# Patient Record
Sex: FEMALE | Race: WHITE | HISPANIC OR LATINO | URBAN - METROPOLITAN AREA
[De-identification: names, ages, dates, MRNs, and addresses within clinical notes are randomized per-mention and may not be internally consistent; named-entity substitution may affect disease eponyms.]

---

## 2021-03-07 ENCOUNTER — INPATIENT (INPATIENT)
Facility: HOSPITAL | Age: 36
LOS: 6 days | Discharge: HOME | DRG: 177 | End: 2021-03-14
Attending: HOSPITALIST | Admitting: HOSPITALIST
Payer: MEDICARE

## 2021-03-07 VITALS
HEIGHT: 64 IN | OXYGEN SATURATION: 92 % | SYSTOLIC BLOOD PRESSURE: 136 MMHG | RESPIRATION RATE: 26 BRPM | HEART RATE: 123 BPM | TEMPERATURE: 100 F | WEIGHT: 244.93 LBS | DIASTOLIC BLOOD PRESSURE: 78 MMHG

## 2021-03-07 DIAGNOSIS — R06.02 SHORTNESS OF BREATH: ICD-10-CM

## 2021-03-07 LAB
ALBUMIN SERPL ELPH-MCNC: 3.5 G/DL — SIGNIFICANT CHANGE UP (ref 3.3–5.2)
ALP SERPL-CCNC: 57 U/L — SIGNIFICANT CHANGE UP (ref 40–120)
ALT FLD-CCNC: 60 U/L — HIGH
ANION GAP SERPL CALC-SCNC: 12 MMOL/L — SIGNIFICANT CHANGE UP (ref 5–17)
AST SERPL-CCNC: 69 U/L — HIGH
BASOPHILS # BLD AUTO: 0.03 K/UL — SIGNIFICANT CHANGE UP (ref 0–0.2)
BASOPHILS NFR BLD AUTO: 0.9 % — SIGNIFICANT CHANGE UP (ref 0–2)
BILIRUB SERPL-MCNC: 0.3 MG/DL — LOW (ref 0.4–2)
BUN SERPL-MCNC: 6 MG/DL — LOW (ref 8–20)
CALCIUM SERPL-MCNC: 7.9 MG/DL — LOW (ref 8.6–10.2)
CHLORIDE SERPL-SCNC: 98 MMOL/L — SIGNIFICANT CHANGE UP (ref 98–107)
CO2 SERPL-SCNC: 25 MMOL/L — SIGNIFICANT CHANGE UP (ref 22–29)
CREAT SERPL-MCNC: 0.5 MG/DL — SIGNIFICANT CHANGE UP (ref 0.5–1.3)
CRP SERPL-MCNC: 125 MG/L — HIGH
D DIMER BLD IA.RAPID-MCNC: 359 NG/ML DDU — HIGH
EOSINOPHIL # BLD AUTO: 0 K/UL — SIGNIFICANT CHANGE UP (ref 0–0.5)
EOSINOPHIL NFR BLD AUTO: 0 % — SIGNIFICANT CHANGE UP (ref 0–6)
FERRITIN SERPL-MCNC: 623 NG/ML — HIGH (ref 15–150)
GIANT PLATELETS BLD QL SMEAR: PRESENT — SIGNIFICANT CHANGE UP
GLUCOSE BLDC GLUCOMTR-MCNC: 115 MG/DL — HIGH (ref 70–99)
GLUCOSE SERPL-MCNC: 110 MG/DL — HIGH (ref 70–99)
HCT VFR BLD CALC: 39.3 % — SIGNIFICANT CHANGE UP (ref 34.5–45)
HGB BLD-MCNC: 13.3 G/DL — SIGNIFICANT CHANGE UP (ref 11.5–15.5)
LACTATE BLDV-MCNC: 1 MMOL/L — SIGNIFICANT CHANGE UP (ref 0.5–2)
LYMPHOCYTES # BLD AUTO: 0.57 K/UL — LOW (ref 1–3.3)
LYMPHOCYTES # BLD AUTO: 20.3 % — SIGNIFICANT CHANGE UP (ref 13–44)
MANUAL SMEAR VERIFICATION: SIGNIFICANT CHANGE UP
MCHC RBC-ENTMCNC: 30 PG — SIGNIFICANT CHANGE UP (ref 27–34)
MCHC RBC-ENTMCNC: 33.8 GM/DL — SIGNIFICANT CHANGE UP (ref 32–36)
MCV RBC AUTO: 88.5 FL — SIGNIFICANT CHANGE UP (ref 80–100)
MONOCYTES # BLD AUTO: 0.2 K/UL — SIGNIFICANT CHANGE UP (ref 0–0.9)
MONOCYTES NFR BLD AUTO: 7.1 % — SIGNIFICANT CHANGE UP (ref 2–14)
NEUTROPHILS # BLD AUTO: 1.99 K/UL — SIGNIFICANT CHANGE UP (ref 1.8–7.4)
NEUTROPHILS NFR BLD AUTO: 69 % — SIGNIFICANT CHANGE UP (ref 43–77)
NEUTS BAND # BLD: 1.8 % — SIGNIFICANT CHANGE UP (ref 0–8)
PLAT MORPH BLD: NORMAL — SIGNIFICANT CHANGE UP
PLATELET # BLD AUTO: 168 K/UL — SIGNIFICANT CHANGE UP (ref 150–400)
POLYCHROMASIA BLD QL SMEAR: SLIGHT — SIGNIFICANT CHANGE UP
POTASSIUM SERPL-MCNC: 3.4 MMOL/L — LOW (ref 3.5–5.3)
POTASSIUM SERPL-SCNC: 3.4 MMOL/L — LOW (ref 3.5–5.3)
PROCALCITONIN SERPL-MCNC: 0.13 NG/ML — HIGH (ref 0.02–0.1)
PROT SERPL-MCNC: 7 G/DL — SIGNIFICANT CHANGE UP (ref 6.6–8.7)
RBC # BLD: 4.44 M/UL — SIGNIFICANT CHANGE UP (ref 3.8–5.2)
RBC # FLD: 13.7 % — SIGNIFICANT CHANGE UP (ref 10.3–14.5)
RBC BLD AUTO: SIGNIFICANT CHANGE UP
SODIUM SERPL-SCNC: 135 MMOL/L — SIGNIFICANT CHANGE UP (ref 135–145)
VARIANT LYMPHS # BLD: 0.9 % — SIGNIFICANT CHANGE UP (ref 0–6)
WBC # BLD: 2.81 K/UL — LOW (ref 3.8–10.5)
WBC # FLD AUTO: 2.81 K/UL — LOW (ref 3.8–10.5)

## 2021-03-07 PROCEDURE — 99284 EMERGENCY DEPT VISIT MOD MDM: CPT

## 2021-03-07 PROCEDURE — 93010 ELECTROCARDIOGRAM REPORT: CPT

## 2021-03-07 PROCEDURE — 71045 X-RAY EXAM CHEST 1 VIEW: CPT | Mod: 26

## 2021-03-07 PROCEDURE — 99223 1ST HOSP IP/OBS HIGH 75: CPT

## 2021-03-07 RX ORDER — SODIUM CHLORIDE 9 MG/ML
1000 INJECTION, SOLUTION INTRAVENOUS
Refills: 0 | Status: DISCONTINUED | OUTPATIENT
Start: 2021-03-07 | End: 2021-03-14

## 2021-03-07 RX ORDER — INSULIN LISPRO 100/ML
VIAL (ML) SUBCUTANEOUS
Refills: 0 | Status: DISCONTINUED | OUTPATIENT
Start: 2021-03-07 | End: 2021-03-14

## 2021-03-07 RX ORDER — ACETAMINOPHEN 500 MG
650 TABLET ORAL EVERY 6 HOURS
Refills: 0 | Status: DISCONTINUED | OUTPATIENT
Start: 2021-03-07 | End: 2021-03-14

## 2021-03-07 RX ORDER — DEXTROSE 50 % IN WATER 50 %
15 SYRINGE (ML) INTRAVENOUS ONCE
Refills: 0 | Status: DISCONTINUED | OUTPATIENT
Start: 2021-03-07 | End: 2021-03-14

## 2021-03-07 RX ORDER — ENOXAPARIN SODIUM 100 MG/ML
40 INJECTION SUBCUTANEOUS EVERY 12 HOURS
Refills: 0 | Status: DISCONTINUED | OUTPATIENT
Start: 2021-03-07 | End: 2021-03-14

## 2021-03-07 RX ORDER — ZINC SULFATE TAB 220 MG (50 MG ZINC EQUIVALENT) 220 (50 ZN) MG
220 TAB ORAL DAILY
Refills: 0 | Status: DISCONTINUED | OUTPATIENT
Start: 2021-03-07 | End: 2021-03-14

## 2021-03-07 RX ORDER — GLUCAGON INJECTION, SOLUTION 0.5 MG/.1ML
1 INJECTION, SOLUTION SUBCUTANEOUS ONCE
Refills: 0 | Status: DISCONTINUED | OUTPATIENT
Start: 2021-03-07 | End: 2021-03-14

## 2021-03-07 RX ORDER — DEXAMETHASONE 0.5 MG/5ML
6 ELIXIR ORAL DAILY
Refills: 0 | Status: DISCONTINUED | OUTPATIENT
Start: 2021-03-07 | End: 2021-03-14

## 2021-03-07 RX ORDER — ASCORBIC ACID 60 MG
500 TABLET,CHEWABLE ORAL DAILY
Refills: 0 | Status: DISCONTINUED | OUTPATIENT
Start: 2021-03-07 | End: 2021-03-08

## 2021-03-07 RX ORDER — DEXTROSE 50 % IN WATER 50 %
12.5 SYRINGE (ML) INTRAVENOUS ONCE
Refills: 0 | Status: DISCONTINUED | OUTPATIENT
Start: 2021-03-07 | End: 2021-03-14

## 2021-03-07 RX ORDER — CHOLECALCIFEROL (VITAMIN D3) 125 MCG
400 CAPSULE ORAL
Refills: 0 | Status: DISCONTINUED | OUTPATIENT
Start: 2021-03-07 | End: 2021-03-08

## 2021-03-07 RX ORDER — DEXTROSE 50 % IN WATER 50 %
25 SYRINGE (ML) INTRAVENOUS ONCE
Refills: 0 | Status: DISCONTINUED | OUTPATIENT
Start: 2021-03-07 | End: 2021-03-14

## 2021-03-07 RX ORDER — DEXAMETHASONE 0.5 MG/5ML
6 ELIXIR ORAL ONCE
Refills: 0 | Status: COMPLETED | OUTPATIENT
Start: 2021-03-07 | End: 2021-03-07

## 2021-03-07 RX ORDER — ONDANSETRON 8 MG/1
4 TABLET, FILM COATED ORAL EVERY 6 HOURS
Refills: 0 | Status: DISCONTINUED | OUTPATIENT
Start: 2021-03-07 | End: 2021-03-14

## 2021-03-07 RX ORDER — POTASSIUM CHLORIDE 20 MEQ
40 PACKET (EA) ORAL ONCE
Refills: 0 | Status: COMPLETED | OUTPATIENT
Start: 2021-03-07 | End: 2021-03-07

## 2021-03-07 RX ORDER — SODIUM CHLORIDE 9 MG/ML
3 INJECTION INTRAMUSCULAR; INTRAVENOUS; SUBCUTANEOUS EVERY 8 HOURS
Refills: 0 | Status: DISCONTINUED | OUTPATIENT
Start: 2021-03-07 | End: 2021-03-14

## 2021-03-07 RX ADMIN — SODIUM CHLORIDE 3 MILLILITER(S): 9 INJECTION INTRAMUSCULAR; INTRAVENOUS; SUBCUTANEOUS at 22:09

## 2021-03-07 RX ADMIN — Medication 6 MILLIGRAM(S): at 20:29

## 2021-03-07 RX ADMIN — Medication 40 MILLIEQUIVALENT(S): at 22:10

## 2021-03-07 NOTE — ED ADULT NURSE NOTE - CHIEF COMPLAINT QUOTE
Palliative Care mid sternal chest pain with SOB, congestion and fever since friday. was prescribed azithromycin for " congestion", now feeling worse. 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

## 2021-03-07 NOTE — ED PROVIDER NOTE - ATTENDING CONTRIBUTION TO CARE
Pt. awake and alert. Pt. hypoxic. Pt. will require admission. I, Dr. Brown, performed a face to face bedside interview with this patient regarding history of present illness, review of symptoms and relevant past medical, social and family history.  I completed an independent physical examination.  I have also reviewed the ACP's note(s) and discussed the plan with the ACP.

## 2021-03-07 NOTE — H&P ADULT - PROBLEM SELECTOR PLAN 1
Admit to , cont. with supplemental 02, IV decadron, Incentive spirometer, prn tylenol, zinc, vit. c/d, trend inflammatory markers, Start on remdesivir once COVID PCR confirmed. OOB, DVT-P, ambulate.

## 2021-03-07 NOTE — ED ADULT TRIAGE NOTE - CHIEF COMPLAINT QUOTE
mid sternal chest pain with SOB, congestion and fever since friday. was prescribed azithromycin for " congestion", now feeling worse. 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

## 2021-03-07 NOTE — H&P ADULT - HISTORY OF PRESENT ILLNESS
36 y/o female who denies any known pmhx who has been home with her daughter and  c/o fever, cough, muscle aches. 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 She was given Zithromax by that office and on Friday and took 2 doses but it did not help her symptoms. She came to the ED when her home pulse oximeter showed she was saturating 90 on RA. In the ED noted to be 88% RA, goes up to 95% on 6 liters NC. Inflammatory markers elevated, COVID pending. CXR with diffuse intersitial infiltrates.

## 2021-03-07 NOTE — ED PROVIDER NOTE - OBJECTIVE STATEMENT
34 y/o F c/o fever, cough, shortness of breath, loss of appetite x 3 days.  Patient has not yet been tested for covid.  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  Denies any PMH.

## 2021-03-08 DIAGNOSIS — U07.1 COVID-19: ICD-10-CM

## 2021-03-08 DIAGNOSIS — J96.01 ACUTE RESPIRATORY FAILURE WITH HYPOXIA: ICD-10-CM

## 2021-03-08 LAB
A1C WITH ESTIMATED AVERAGE GLUCOSE RESULT: 5.5 % — SIGNIFICANT CHANGE UP (ref 4–5.6)
ALBUMIN SERPL ELPH-MCNC: 3.3 G/DL — SIGNIFICANT CHANGE UP (ref 3.3–5.2)
ALP SERPL-CCNC: 57 U/L — SIGNIFICANT CHANGE UP (ref 40–120)
ALT FLD-CCNC: 57 U/L — HIGH
ANION GAP SERPL CALC-SCNC: 13 MMOL/L — SIGNIFICANT CHANGE UP (ref 5–17)
AST SERPL-CCNC: 53 U/L — HIGH
BASOPHILS # BLD AUTO: 0 K/UL — SIGNIFICANT CHANGE UP (ref 0–0.2)
BASOPHILS NFR BLD AUTO: 0 % — SIGNIFICANT CHANGE UP (ref 0–2)
BILIRUB DIRECT SERPL-MCNC: 0.1 MG/DL — SIGNIFICANT CHANGE UP (ref 0–0.3)
BILIRUB INDIRECT FLD-MCNC: 0.2 MG/DL — SIGNIFICANT CHANGE UP (ref 0.2–1)
BILIRUB SERPL-MCNC: 0.3 MG/DL — LOW (ref 0.4–2)
BUN SERPL-MCNC: 8 MG/DL — SIGNIFICANT CHANGE UP (ref 8–20)
CALCIUM SERPL-MCNC: 8 MG/DL — LOW (ref 8.6–10.2)
CHLORIDE SERPL-SCNC: 101 MMOL/L — SIGNIFICANT CHANGE UP (ref 98–107)
CO2 SERPL-SCNC: 26 MMOL/L — SIGNIFICANT CHANGE UP (ref 22–29)
CREAT SERPL-MCNC: 0.43 MG/DL — LOW (ref 0.5–1.3)
EOSINOPHIL # BLD AUTO: 0 K/UL — SIGNIFICANT CHANGE UP (ref 0–0.5)
EOSINOPHIL NFR BLD AUTO: 0 % — SIGNIFICANT CHANGE UP (ref 0–6)
ESTIMATED AVERAGE GLUCOSE: 111 MG/DL — SIGNIFICANT CHANGE UP (ref 68–114)
GLUCOSE BLDC GLUCOMTR-MCNC: 109 MG/DL — HIGH (ref 70–99)
GLUCOSE BLDC GLUCOMTR-MCNC: 121 MG/DL — HIGH (ref 70–99)
GLUCOSE BLDC GLUCOMTR-MCNC: 127 MG/DL — HIGH (ref 70–99)
GLUCOSE BLDC GLUCOMTR-MCNC: 129 MG/DL — HIGH (ref 70–99)
GLUCOSE SERPL-MCNC: 142 MG/DL — HIGH (ref 70–99)
HCT VFR BLD CALC: 37.6 % — SIGNIFICANT CHANGE UP (ref 34.5–45)
HGB BLD-MCNC: 12.7 G/DL — SIGNIFICANT CHANGE UP (ref 11.5–15.5)
IMM GRANULOCYTES NFR BLD AUTO: 0 % — SIGNIFICANT CHANGE UP (ref 0–1.5)
INR BLD: 1.17 RATIO — HIGH (ref 0.88–1.16)
LYMPHOCYTES # BLD AUTO: 0.51 K/UL — LOW (ref 1–3.3)
LYMPHOCYTES # BLD AUTO: 33.6 % — SIGNIFICANT CHANGE UP (ref 13–44)
MAGNESIUM SERPL-MCNC: 2.2 MG/DL — SIGNIFICANT CHANGE UP (ref 1.8–2.6)
MCHC RBC-ENTMCNC: 30.3 PG — SIGNIFICANT CHANGE UP (ref 27–34)
MCHC RBC-ENTMCNC: 33.8 GM/DL — SIGNIFICANT CHANGE UP (ref 32–36)
MCV RBC AUTO: 89.7 FL — SIGNIFICANT CHANGE UP (ref 80–100)
MONOCYTES # BLD AUTO: 0.12 K/UL — SIGNIFICANT CHANGE UP (ref 0–0.9)
MONOCYTES NFR BLD AUTO: 7.9 % — SIGNIFICANT CHANGE UP (ref 2–14)
NEUTROPHILS # BLD AUTO: 0.89 K/UL — LOW (ref 1.8–7.4)
NEUTROPHILS NFR BLD AUTO: 58.5 % — SIGNIFICANT CHANGE UP (ref 43–77)
PHOSPHATE SERPL-MCNC: 3.2 MG/DL — SIGNIFICANT CHANGE UP (ref 2.4–4.7)
PLATELET # BLD AUTO: 175 K/UL — SIGNIFICANT CHANGE UP (ref 150–400)
POTASSIUM SERPL-MCNC: 3.9 MMOL/L — SIGNIFICANT CHANGE UP (ref 3.5–5.3)
POTASSIUM SERPL-SCNC: 3.9 MMOL/L — SIGNIFICANT CHANGE UP (ref 3.5–5.3)
PROT SERPL-MCNC: 6.8 G/DL — SIGNIFICANT CHANGE UP (ref 6.6–8.7)
PROTHROM AB SERPL-ACNC: 13.5 SEC — SIGNIFICANT CHANGE UP (ref 10.6–13.6)
RBC # BLD: 4.19 M/UL — SIGNIFICANT CHANGE UP (ref 3.8–5.2)
RBC # FLD: 13.9 % — SIGNIFICANT CHANGE UP (ref 10.3–14.5)
SARS-COV-2 IGG SERPL QL IA: POSITIVE
SARS-COV-2 IGM SERPL IA-ACNC: 3.71 INDEX — HIGH
SARS-COV-2 RNA SPEC QL NAA+PROBE: DETECTED
SODIUM SERPL-SCNC: 140 MMOL/L — SIGNIFICANT CHANGE UP (ref 135–145)
WBC # BLD: 1.52 K/UL — LOW (ref 3.8–10.5)
WBC # FLD AUTO: 1.52 K/UL — LOW (ref 3.8–10.5)

## 2021-03-08 PROCEDURE — 99222 1ST HOSP IP/OBS MODERATE 55: CPT

## 2021-03-08 PROCEDURE — 99233 SBSQ HOSP IP/OBS HIGH 50: CPT

## 2021-03-08 RX ORDER — REMDESIVIR 5 MG/ML
INJECTION INTRAVENOUS
Refills: 0 | Status: COMPLETED | OUTPATIENT
Start: 2021-03-08 | End: 2021-03-12

## 2021-03-08 RX ORDER — ASCORBIC ACID 60 MG
500 TABLET,CHEWABLE ORAL
Refills: 0 | Status: DISCONTINUED | OUTPATIENT
Start: 2021-03-08 | End: 2021-03-14

## 2021-03-08 RX ORDER — CHOLECALCIFEROL (VITAMIN D3) 125 MCG
2000 CAPSULE ORAL DAILY
Refills: 0 | Status: DISCONTINUED | OUTPATIENT
Start: 2021-03-08 | End: 2021-03-14

## 2021-03-08 RX ORDER — REMDESIVIR 5 MG/ML
200 INJECTION INTRAVENOUS EVERY 24 HOURS
Refills: 0 | Status: COMPLETED | OUTPATIENT
Start: 2021-03-08 | End: 2021-03-08

## 2021-03-08 RX ORDER — REMDESIVIR 5 MG/ML
100 INJECTION INTRAVENOUS EVERY 24 HOURS
Refills: 0 | Status: COMPLETED | OUTPATIENT
Start: 2021-03-09 | End: 2021-03-12

## 2021-03-08 RX ORDER — LORATADINE 10 MG/1
10 TABLET ORAL DAILY
Refills: 0 | Status: DISCONTINUED | OUTPATIENT
Start: 2021-03-08 | End: 2021-03-14

## 2021-03-08 RX ORDER — FAMOTIDINE 10 MG/ML
20 INJECTION INTRAVENOUS
Refills: 0 | Status: DISCONTINUED | OUTPATIENT
Start: 2021-03-08 | End: 2021-03-14

## 2021-03-08 RX ADMIN — ENOXAPARIN SODIUM 40 MILLIGRAM(S): 100 INJECTION SUBCUTANEOUS at 18:25

## 2021-03-08 RX ADMIN — LORATADINE 10 MILLIGRAM(S): 10 TABLET ORAL at 13:16

## 2021-03-08 RX ADMIN — Medication 1 TABLET(S): at 13:18

## 2021-03-08 RX ADMIN — SODIUM CHLORIDE 3 MILLILITER(S): 9 INJECTION INTRAMUSCULAR; INTRAVENOUS; SUBCUTANEOUS at 06:04

## 2021-03-08 RX ADMIN — FAMOTIDINE 20 MILLIGRAM(S): 10 INJECTION INTRAVENOUS at 18:25

## 2021-03-08 RX ADMIN — ZINC SULFATE TAB 220 MG (50 MG ZINC EQUIVALENT) 220 MILLIGRAM(S): 220 (50 ZN) TAB at 13:16

## 2021-03-08 RX ADMIN — Medication 2000 UNIT(S): at 13:26

## 2021-03-08 RX ADMIN — REMDESIVIR 500 MILLIGRAM(S): 5 INJECTION INTRAVENOUS at 06:01

## 2021-03-08 RX ADMIN — Medication 500 MILLIGRAM(S): at 18:25

## 2021-03-08 RX ADMIN — Medication 400 UNIT(S): at 06:01

## 2021-03-08 RX ADMIN — Medication 100 MILLIGRAM(S): at 13:18

## 2021-03-08 RX ADMIN — Medication 6 MILLIGRAM(S): at 06:02

## 2021-03-08 RX ADMIN — SODIUM CHLORIDE 3 MILLILITER(S): 9 INJECTION INTRAMUSCULAR; INTRAVENOUS; SUBCUTANEOUS at 21:48

## 2021-03-08 RX ADMIN — Medication 100 MILLIGRAM(S): at 21:47

## 2021-03-08 RX ADMIN — ENOXAPARIN SODIUM 40 MILLIGRAM(S): 100 INJECTION SUBCUTANEOUS at 06:02

## 2021-03-08 RX ADMIN — SODIUM CHLORIDE 3 MILLILITER(S): 9 INJECTION INTRAMUSCULAR; INTRAVENOUS; SUBCUTANEOUS at 14:20

## 2021-03-08 NOTE — CONSULT NOTE ADULT - ASSESSMENT
This 34 y/o female who denies any known pmhx who has been home with her daughter and  c/o fever, cough, muscle aches. 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 She was given Zithromax by that office and on Friday and took 2 doses but it did not help her symptoms. She came to the ED when her home pulse oximeter showed she was saturating 90 on RA. In the ED noted to be 88% RA, goes up to 95% on 6 liters NC. Inflammatory markers elevated, COVID pending. CXR with diffuse intersitial infiltrates.  (07 Mar 2021 23:04)      patient still has SOB. on oxygen, Currently proning. COVID testing is confirmed positive here.   Xray of the lungs show: Bilateral patchy airspace opacities, most prominent at the lung bases, compatible with multifocal pneumonia. Follow-up to resolution is recommended.    patient is on nasal cannula.      Impression:  COVID-19 infection   Viral pneumonia  shortness of breath  lung infiltrates  dependence on oxygen  Leukopenia    Plan:  - start Remdesivir IV daily.  will plan for a 5 day course.   - MAY stop earlier than 5 days, and send home, if patient is back on Ambient oxygen/ room air.  - alternatively MAY need a 10 day course if little improvement during the 5 day course.      - continue dexamethasone 6mg daily, While on remdesivir  Inflammatory markers and LFTs WILL BE ORDERED with the REMDESIVIR order SET.  DO NOT ORDER this additionally.   - trend CBC with diff, CMP  daily    - Continue supportive care measures  - continue Oxygenation as needed;  CONTINUE to titrate down as tolerated  - self- proning  as tolerated  - ENCOURAGED OOB to chair  - encouraged incentive spirometry        Leukopenia  - from viral infection  - will follow & trend      Will follow with you.

## 2021-03-08 NOTE — CONSULT NOTE ADULT - SUBJECTIVE AND OBJECTIVE BOX
[facility]  INFECTIOUS DISEASES AND INTERNAL MEDICINE at [place]  =======================================================  Lincoln Donohue MD  Diplomates American Board of Internal Medicine and Infectious Diseases  Tel  [phone]  Fax [phone]  =======================================================    [id number]  BOBBY TREVINO   HPI:  36 y/o female who denies any known pmhx who has been home with her daughter and  c/o fever, cough, muscle aches. 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 She was given Zithromax by that office and on Friday and took 2 doses but it did not help her symptoms. She came to the ED when her home pulse oximeter showed she was saturating 90 on RA. In the ED noted to be 88% RA, goes up to 95% on 6 liters NC. Inflammatory markers elevated, COVID pending. CXR with diffuse intersitial infiltrates.  (07 Mar 2021 23:04)      patient still has SOB. on oxygen, Currently proning. COVID testing is confirmed positive here.   Xray of the lungs show: Bilateral patchy airspace opacities, most prominent at the lung bases, compatible with multifocal pneumonia. Follow-up to resolution is recommended.    patient is on nasal cannula.    I have personally reviewed the labs and data; pertinent labs and data are listed in this note; please see below.   =======================================================  Past Medical & Surgical Hx:  =====================  PAST MEDICAL & SURGICAL HISTORY:  No pertinent past medical history    No significant past surgical history      Problem List:  ==========  HEALTH ISSUES - PROBLEM Dx:  2019 novel coronavirus disease (COVID-19)  Acute respiratory failure with hypoxia         Social Hx:  =======  no toxic habits currently    FAMILY HISTORY:  no significant family history of immunosuppressive disorders in mother or father   =======================================================    REVIEW OF SYSTEMS:  CONSTITUTIONAL:  as per HPI  HEENT:  No diplopia or blurred vision.  No earache, sore throat or runny nose.  CARDIOVASCULAR:  No pressure, squeezing, strangling, tightness, heaviness or aching about the chest, neck, axilla or epigastrium.  RESPIRATORY:  as per HPI  GASTROINTESTINAL:  No nausea, vomiting or diarrhea.  GENITOURINARY:  No dysuria, frequency or urgency. No Blood in urine  MUSCULOSKELETAL:  no joint aches, no muscle pain  SKIN:  No change in skin, hair or nails.  NEUROLOGIC:  No Headaches, seizures or weakness.  PSYCHIATRIC:  No disorder of thought or mood.  ENDOCRINE:  No heat or cold intolerance  HEMATOLOGICAL:  No easy bruising or bleeding.    =======================================================  Allergies  No Known Allergies      Antibiotics:  remdesivir  IVPB   IV Intermittent     Other medications:  ascorbic acid 500 milliGRAM(s) Oral two times a day  benzonatate 100 milliGRAM(s) Oral three times a day  cholecalciferol 2000 Unit(s) Oral daily  dexAMETHasone  Injectable 6 milliGRAM(s) IV Push daily  dextrose 40% Gel 15 Gram(s) Oral once  dextrose 5%. 1000 milliLiter(s) IV Continuous <Continuous>  dextrose 5%. 1000 milliLiter(s) IV Continuous <Continuous>  dextrose 50% Injectable 25 Gram(s) IV Push once  dextrose 50% Injectable 12.5 Gram(s) IV Push once  dextrose 50% Injectable 25 Gram(s) IV Push once  enoxaparin Injectable 40 milliGRAM(s) SubCutaneous every 12 hours  famotidine    Tablet 20 milliGRAM(s) Oral two times a day  glucagon  Injectable 1 milliGRAM(s) IntraMuscular once  insulin lispro (ADMELOG) corrective regimen sliding scale   SubCutaneous Before meals and at bedtime  loratadine 10 milliGRAM(s) Oral daily  multivitamin 1 Tablet(s) Oral daily  sodium chloride 0.9% lock flush 3 milliLiter(s) IV Push every 8 hours  zinc sulfate 220 milliGRAM(s) Oral daily     remdesivir  IVPB   500 mL/Hr IV Intermittent (03-08-21 @ 06:01)      ======================================================  Physical Exam:  ============  T(F): 98.5 (08 Mar 2021 11:45), Max: 100 (07 Mar 2021 18:29)  HR: 84 (08 Mar 2021 11:45)  BP: 126/86 (08 Mar 2021 11:45)  RR: 18 (08 Mar 2021 11:45)  SpO2: 92% (08 Mar 2021 11:45) (88% - 98%)  temp max in last 48H T(F): , Max: 100 (03-07-21 @ 18:29)Height (cm): 162.6 (03-07-21 @ 18:29)  Weight (kg): 111.1 (03-07-21 @ 18:29)  BMI (kg/m2): 42 (03-07-21 @ 18:29)  BSA (m2): 2.13 (03-07-21 @ 18:29)    General:  No acute distress.  OBESE  Eye: Pupils are equal, round and reactive to light, Extraocular movements are intact, Normal conjunctiva.  HENT: Normocephalic, Oral mucosa is moist, No pharyngeal erythema, No sinus tenderness.  ON SUPPLEMENTAL OXYGEN  Neck: Supple, No lymphadenopathy.  Respiratory: Lungs  with COARSE breath sounds; worse at bases  Cardiovascular: Normal rate, Regular rhythm,   Gastrointestinal: Soft, Non-tender, Non-distended, Normal bowel sounds.  Genitourinary: No costovertebral angle tenderness.  Lymphatics: No lymphadenopathy neck,   Musculoskeletal: Normal range of motion, Normal strength.  Integumentary: No rash.  Neurologic: Alert, Oriented, No focal deficits, Cranial Nerves II-XII are grossly intact.  Psychiatric: Appropriate mood & affect.    =======================================================  Labs:                        12.7   1.52  )-----------( 175      ( 08 Mar 2021 07:13 )             37.6     03-08    140  |  101  |  8.0  ----------------------------<  142<H>  3.9   |  26.0  |  0.43<L>    Ca    8.0<L>      08 Mar 2021 07:11  Phos  3.2     03-08  Mg     2.2     03-08    TPro  6.8  /  Alb  3.3  /  TBili  0.3<L>  /  DBili  0.1  /  AST  53<H>  /  ALT  57<H>  /  AlkPhos  57  03-08      Creatinine, Serum: 0.43 mg/dL (03-08-21 @ 07:11)  Creatinine, Serum: 0.50 mg/dL (03-07-21 @ 20:15)    C-Reactive Protein, Serum: 125 mg/L (03-07-21 @ 20:15)      Procalcitonin, Serum: 0.13 ng/mL (03-07-21 @ 20:15)      COVID-19 PCR: Detected (03-07-21 @ 20:29)      < from: Xray Chest 1 View-PORTABLE IMMEDIATE (03.07.21 @ 19:51) >   EXAM:  XR CHEST PORTABLE IMMED 1V                          PROCEDURE DATE:  03/07/2021          INTERPRETATION:  PROCEDURE: AP view of the chest.    CLINICAL INFORMATION: Shortness of breath, cough and fever.    COMPARISON: None.    FINDINGS:    Lungs: There are bilateral patchy airspace opacities most prominent at the lung bases.  Heart: The heart is normal in size.  Mediastinum: The mediastinum is within normal limits.    IMPRESSION:  Bilateral patchy airspace opacities, most prominent at the lung bases, compatible with multifocal pneumonia. Follow-up to resolution is recommended.         NIRANJAN GUEVARA MD; Attending Radiologist  This document has been electronically signed. Mar  8 2021  8:07AM    < end of copied text >

## 2021-03-08 NOTE — PROGRESS NOTE ADULT - SUBJECTIVE AND OBJECTIVE BOX
Internal Medicine   CC SOB and cough    pt is seen examined , she is with c/o cough and SOB worse on breathing feels liek can not breath in   denies any CP , looks comfortable     she had tried proning states that  was not able to tolerate it much , encourage her to try again and cont breathing exercise               ROS: as above, all remaining ROS are negative.       BACKGROUND:  MEDICATIONS  (STANDING):  ascorbic acid 500 milliGRAM(s) Oral daily  cholecalciferol 400 Unit(s) Oral two times a day  dexAMETHasone  Injectable 6 milliGRAM(s) IV Push daily  dextrose 40% Gel 15 Gram(s) Oral once  dextrose 5%. 1000 milliLiter(s) (50 mL/Hr) IV Continuous <Continuous>  dextrose 5%. 1000 milliLiter(s) (100 mL/Hr) IV Continuous <Continuous>  dextrose 50% Injectable 25 Gram(s) IV Push once  dextrose 50% Injectable 12.5 Gram(s) IV Push once  dextrose 50% Injectable 25 Gram(s) IV Push once  enoxaparin Injectable 40 milliGRAM(s) SubCutaneous every 12 hours  glucagon  Injectable 1 milliGRAM(s) IntraMuscular once  insulin lispro (ADMELOG) corrective regimen sliding scale   SubCutaneous Before meals and at bedtime  multivitamin 1 Tablet(s) Oral daily  remdesivir  IVPB   IV Intermittent   sodium chloride 0.9% lock flush 3 milliLiter(s) IV Push every 8 hours  zinc sulfate 220 milliGRAM(s) Oral daily    MEDICATIONS  (PRN):  acetaminophen   Tablet .. 650 milliGRAM(s) Oral every 6 hours PRN Temp greater or equal to 38C (100.4F), Mild Pain (1 - 3)  ondansetron Injectable 4 milliGRAM(s) IV Push every 6 hours PRN Nausea    Allergies    No Known Allergies    Intolerances            VITALS:  Vital Signs Last 24 Hrs  T(C): 36.6 (08 Mar 2021 04:15), Max: 37.8 (07 Mar 2021 18:29)  T(F): 97.8 (08 Mar 2021 04:15), Max: 100 (07 Mar 2021 18:29)  HR: 103 (08 Mar 2021 04:15) (76 - 123)  BP: 131/84 (08 Mar 2021 04:15) (131/84 - 139/85)  BP(mean): 94 (07 Mar 2021 23:04) (94 - 94)  RR: 18 (08 Mar 2021 04:15) (18 - 28)  SpO2: 96% (08 Mar 2021 03:48) (88% - 98%) Daily Height in cm: 162.56 (07 Mar 2021 18:29)    Daily   CAPILLARY BLOOD GLUCOSE      POCT Blood Glucose.: 121 mg/dL (08 Mar 2021 08:27)  POCT Blood Glucose.: 115 mg/dL (07 Mar 2021 23:19)    I&O's Summary      PHYSICAL EXAM:      Constitutional: no resp distress  sitting in the bed     Neck: supple , no JVD     Respiratory: diminished BS , poor air entry     Cardiovascular: regular s1 /s2     Gastrointestinal: soft no tenderness , BS positive     Extremities: no pretibial edema           LABS:                        12.7   1.52  )-----------( 175      ( 08 Mar 2021 07:13 )             37.6     03-08    140  |  101  |  8.0  ----------------------------<  142<H>  3.9   |  26.0  |  0.43<L>    Ca    8.0<L>      08 Mar 2021 07:11  Phos  3.2     03-08  Mg     2.2     03-08    TPro  6.8  /  Alb  3.3  /  TBili  0.3<L>  /  DBili  0.1  /  AST  53<H>  /  ALT  57<H>  /  AlkPhos  57  03-08    PT/INR - ( 08 Mar 2021 07:10 )   PT: 13.5 sec;   INR: 1.17 ratio             Radiology :      c     Internal Medicine   CC SOB and cough    pt is seen examined , she is with c/o cough and SOB worse on breathing feels liek can not breath in   denies any CP , looks comfortable     she had tried proning states that  was not able to tolerate it much , encourage her to try again and cont breathing exercise           ROS: as above, all remaining ROS are negative.       BACKGROUND:  MEDICATIONS  (STANDING):  ascorbic acid 500 milliGRAM(s) Oral daily  cholecalciferol 400 Unit(s) Oral two times a day  dexAMETHasone  Injectable 6 milliGRAM(s) IV Push daily  dextrose 40% Gel 15 Gram(s) Oral once  dextrose 5%. 1000 milliLiter(s) (50 mL/Hr) IV Continuous <Continuous>  dextrose 5%. 1000 milliLiter(s) (100 mL/Hr) IV Continuous <Continuous>  dextrose 50% Injectable 25 Gram(s) IV Push once  dextrose 50% Injectable 12.5 Gram(s) IV Push once  dextrose 50% Injectable 25 Gram(s) IV Push once  enoxaparin Injectable 40 milliGRAM(s) SubCutaneous every 12 hours  glucagon  Injectable 1 milliGRAM(s) IntraMuscular once  insulin lispro (ADMELOG) corrective regimen sliding scale   SubCutaneous Before meals and at bedtime  multivitamin 1 Tablet(s) Oral daily  remdesivir  IVPB   IV Intermittent   sodium chloride 0.9% lock flush 3 milliLiter(s) IV Push every 8 hours  zinc sulfate 220 milliGRAM(s) Oral daily    MEDICATIONS  (PRN):  acetaminophen   Tablet .. 650 milliGRAM(s) Oral every 6 hours PRN Temp greater or equal to 38C (100.4F), Mild Pain (1 - 3)  ondansetron Injectable 4 milliGRAM(s) IV Push every 6 hours PRN Nausea    Allergies    No Known Allergies    Intolerances            VITALS:  Vital Signs Last 24 Hrs  T(C): 36.6 (08 Mar 2021 04:15), Max: 37.8 (07 Mar 2021 18:29)  T(F): 97.8 (08 Mar 2021 04:15), Max: 100 (07 Mar 2021 18:29)  HR: 103 (08 Mar 2021 04:15) (76 - 123)  BP: 131/84 (08 Mar 2021 04:15) (131/84 - 139/85)  BP(mean): 94 (07 Mar 2021 23:04) (94 - 94)  RR: 18 (08 Mar 2021 04:15) (18 - 28)  SpO2: 96% (08 Mar 2021 03:48) (88% - 98%) Daily Height in cm: 162.56 (07 Mar 2021 18:29)    Daily   CAPILLARY BLOOD GLUCOSE      POCT Blood Glucose.: 121 mg/dL (08 Mar 2021 08:27)  POCT Blood Glucose.: 115 mg/dL (07 Mar 2021 23:19)    I&O's Summary      PHYSICAL EXAM:      Constitutional: no resp distress  sitting in the bed     Neck: supple , no JVD     Respiratory: diminished BS , poor air entry     Cardiovascular: regular s1 /s2     Gastrointestinal: soft no tenderness , BS positive     Extremities: no pretibial edema           LABS:                        12.7   1.52  )-----------( 175      ( 08 Mar 2021 07:13 )             37.6     03-08    140  |  101  |  8.0  ----------------------------<  142<H>  3.9   |  26.0  |  0.43<L>    Ca    8.0<L>      08 Mar 2021 07:11  Phos  3.2     03-08  Mg     2.2     03-08    TPro  6.8  /  Alb  3.3  /  TBili  0.3<L>  /  DBili  0.1  /  AST  53<H>  /  ALT  57<H>  /  AlkPhos  57  03-08    PT/INR - ( 08 Mar 2021 07:10 )   PT: 13.5 sec;   INR: 1.17 ratio             Radiology :      c

## 2021-03-08 NOTE — PROGRESS NOTE ADULT - ASSESSMENT
36 yo F obese with COVID 19 infection hypoxia likely due to viral infection and PNA     1- Hypoxic resp failure due to Covid PNA   cont remdesevir and decdaron   oxygen and breathing exercise   instructed her to prone   cont supportive therapy   monitor LFT's and inflamatory markers     DVT prophylaxis lovenox 40 mg bid     add pepcid

## 2021-03-09 LAB
ALBUMIN SERPL ELPH-MCNC: 3.3 G/DL — SIGNIFICANT CHANGE UP (ref 3.3–5.2)
ALP SERPL-CCNC: 53 U/L — SIGNIFICANT CHANGE UP (ref 40–120)
ALT FLD-CCNC: 52 U/L — HIGH
AST SERPL-CCNC: 49 U/L — HIGH
BASOPHILS # BLD AUTO: 0 K/UL — SIGNIFICANT CHANGE UP (ref 0–0.2)
BASOPHILS NFR BLD AUTO: 0 % — SIGNIFICANT CHANGE UP (ref 0–2)
BILIRUB DIRECT SERPL-MCNC: 0.1 MG/DL — SIGNIFICANT CHANGE UP (ref 0–0.3)
BILIRUB INDIRECT FLD-MCNC: 0.2 MG/DL — SIGNIFICANT CHANGE UP (ref 0.2–1)
BILIRUB SERPL-MCNC: 0.4 MG/DL — SIGNIFICANT CHANGE UP (ref 0.4–2)
CREAT SERPL-MCNC: 0.39 MG/DL — LOW (ref 0.5–1.3)
DACRYOCYTES BLD QL SMEAR: SLIGHT — SIGNIFICANT CHANGE UP
ELLIPTOCYTES BLD QL SMEAR: SLIGHT — SIGNIFICANT CHANGE UP
EOSINOPHIL # BLD AUTO: 0 K/UL — SIGNIFICANT CHANGE UP (ref 0–0.5)
EOSINOPHIL NFR BLD AUTO: 0 % — SIGNIFICANT CHANGE UP (ref 0–6)
GIANT PLATELETS BLD QL SMEAR: PRESENT — SIGNIFICANT CHANGE UP
GLUCOSE BLDC GLUCOMTR-MCNC: 106 MG/DL — HIGH (ref 70–99)
GLUCOSE BLDC GLUCOMTR-MCNC: 116 MG/DL — HIGH (ref 70–99)
GLUCOSE BLDC GLUCOMTR-MCNC: 122 MG/DL — HIGH (ref 70–99)
GLUCOSE BLDC GLUCOMTR-MCNC: 140 MG/DL — HIGH (ref 70–99)
HCT VFR BLD CALC: 38.9 % — SIGNIFICANT CHANGE UP (ref 34.5–45)
HGB BLD-MCNC: 12.8 G/DL — SIGNIFICANT CHANGE UP (ref 11.5–15.5)
INR BLD: 1.13 RATIO — SIGNIFICANT CHANGE UP (ref 0.88–1.16)
LYMPHOCYTES # BLD AUTO: 0.12 K/UL — LOW (ref 1–3.3)
LYMPHOCYTES # BLD AUTO: 4.3 % — LOW (ref 13–44)
MANUAL SMEAR VERIFICATION: SIGNIFICANT CHANGE UP
MCHC RBC-ENTMCNC: 29.7 PG — SIGNIFICANT CHANGE UP (ref 27–34)
MCHC RBC-ENTMCNC: 32.9 GM/DL — SIGNIFICANT CHANGE UP (ref 32–36)
MCV RBC AUTO: 90.3 FL — SIGNIFICANT CHANGE UP (ref 80–100)
MONOCYTES # BLD AUTO: 0.31 K/UL — SIGNIFICANT CHANGE UP (ref 0–0.9)
MONOCYTES NFR BLD AUTO: 11.3 % — SIGNIFICANT CHANGE UP (ref 2–14)
NEUTROPHILS # BLD AUTO: 2.22 K/UL — SIGNIFICANT CHANGE UP (ref 1.8–7.4)
NEUTROPHILS NFR BLD AUTO: 80.9 % — HIGH (ref 43–77)
OVALOCYTES BLD QL SMEAR: SLIGHT — SIGNIFICANT CHANGE UP
PLAT MORPH BLD: ABNORMAL
PLATELET # BLD AUTO: 243 K/UL — SIGNIFICANT CHANGE UP (ref 150–400)
POIKILOCYTOSIS BLD QL AUTO: SLIGHT — SIGNIFICANT CHANGE UP
POLYCHROMASIA BLD QL SMEAR: SLIGHT — SIGNIFICANT CHANGE UP
PROT SERPL-MCNC: 6.5 G/DL — LOW (ref 6.6–8.7)
PROTHROM AB SERPL-ACNC: 13 SEC — SIGNIFICANT CHANGE UP (ref 10.6–13.6)
RBC # BLD: 4.31 M/UL — SIGNIFICANT CHANGE UP (ref 3.8–5.2)
RBC # FLD: 13.9 % — SIGNIFICANT CHANGE UP (ref 10.3–14.5)
RBC BLD AUTO: ABNORMAL
SMUDGE CELLS # BLD: PRESENT — SIGNIFICANT CHANGE UP
VARIANT LYMPHS # BLD: 3.5 % — SIGNIFICANT CHANGE UP (ref 0–6)
WBC # BLD: 2.75 K/UL — LOW (ref 3.8–10.5)
WBC # FLD AUTO: 2.75 K/UL — LOW (ref 3.8–10.5)

## 2021-03-09 PROCEDURE — 99232 SBSQ HOSP IP/OBS MODERATE 35: CPT

## 2021-03-09 RX ORDER — TOCILIZUMAB 20 MG/ML
800 INJECTION, SOLUTION, CONCENTRATE INTRAVENOUS ONCE
Refills: 0 | Status: COMPLETED | OUTPATIENT
Start: 2021-03-09 | End: 2021-03-10

## 2021-03-09 RX ADMIN — ZINC SULFATE TAB 220 MG (50 MG ZINC EQUIVALENT) 220 MILLIGRAM(S): 220 (50 ZN) TAB at 13:35

## 2021-03-09 RX ADMIN — ENOXAPARIN SODIUM 40 MILLIGRAM(S): 100 INJECTION SUBCUTANEOUS at 05:42

## 2021-03-09 RX ADMIN — Medication 100 MILLIGRAM(S): at 21:07

## 2021-03-09 RX ADMIN — Medication 1 TABLET(S): at 13:35

## 2021-03-09 RX ADMIN — Medication 500 MILLIGRAM(S): at 05:42

## 2021-03-09 RX ADMIN — FAMOTIDINE 20 MILLIGRAM(S): 10 INJECTION INTRAVENOUS at 18:02

## 2021-03-09 RX ADMIN — ENOXAPARIN SODIUM 40 MILLIGRAM(S): 100 INJECTION SUBCUTANEOUS at 18:02

## 2021-03-09 RX ADMIN — Medication 500 MILLIGRAM(S): at 18:01

## 2021-03-09 RX ADMIN — SODIUM CHLORIDE 3 MILLILITER(S): 9 INJECTION INTRAMUSCULAR; INTRAVENOUS; SUBCUTANEOUS at 05:43

## 2021-03-09 RX ADMIN — Medication 2000 UNIT(S): at 13:34

## 2021-03-09 RX ADMIN — SODIUM CHLORIDE 3 MILLILITER(S): 9 INJECTION INTRAMUSCULAR; INTRAVENOUS; SUBCUTANEOUS at 17:28

## 2021-03-09 RX ADMIN — REMDESIVIR 500 MILLIGRAM(S): 5 INJECTION INTRAVENOUS at 05:40

## 2021-03-09 RX ADMIN — LORATADINE 10 MILLIGRAM(S): 10 TABLET ORAL at 13:33

## 2021-03-09 RX ADMIN — FAMOTIDINE 20 MILLIGRAM(S): 10 INJECTION INTRAVENOUS at 05:42

## 2021-03-09 RX ADMIN — Medication 6 MILLIGRAM(S): at 05:42

## 2021-03-09 RX ADMIN — Medication 100 MILLIGRAM(S): at 13:35

## 2021-03-09 RX ADMIN — SODIUM CHLORIDE 3 MILLILITER(S): 9 INJECTION INTRAMUSCULAR; INTRAVENOUS; SUBCUTANEOUS at 21:06

## 2021-03-09 RX ADMIN — Medication 100 MILLIGRAM(S): at 05:42

## 2021-03-09 NOTE — PROGRESS NOTE ADULT - ASSESSMENT
This 36 y/o female who denies any known pmhx who has been home with her daughter and  c/o fever, cough, muscle aches. 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 She was given Zithromax by that office and on Friday and took 2 doses but it did not help her symptoms. She came to the ED when her home pulse oximeter showed she was saturating 90 on RA. In the ED noted to be 88% RA, goes up to 95% on 6 liters NC. Inflammatory markers elevated, COVID pending. CXR with diffuse intersitial infiltrates.  (07 Mar 2021 23:04)      patient still has SOB. on oxygen, Currently proning. COVID testing is confirmed positive here.   Xray of the lungs show: Bilateral patchy airspace opacities, most prominent at the lung bases, compatible with multifocal pneumonia. Follow-up to resolution is recommended.    patient is on nasal cannula.    Impression:  COVID-19 infection   Viral pneumonia  shortness of breath  lung infiltrates  dependence on oxygen  Leukopenia    Plan:  - continue Remdesivir IV daily.   5 day course.   THRU 3/12/2021    - continue dexamethasone 6mg daily, While on remdesivir    Clinically requiring more oxygen  - give Tocilizumab x 1 dose today.     Inflammatory markers and LFTs WILL BE ORDERED with the REMDESIVIR order SET.  DO NOT ORDER this additionally.   - trend CBC with diff, CMP  daily    - Continue supportive care measures  - continue Oxygenation as needed;  CONTINUE to titrate down as tolerated  - self- proning  as tolerated  - ENCOURAGED OOB to chair  - encouraged incentive spirometry     Leukopenia  - from viral infection  - will follow & trend    Will follow with you

## 2021-03-09 NOTE — PROGRESS NOTE ADULT - ATTENDING COMMENTS
agree with above as editied   pt is seen earlier by me ,chart  d/w Christy NP   pt si with worsening hypoxia

## 2021-03-09 NOTE — PROGRESS NOTE ADULT - SUBJECTIVE AND OBJECTIVE BOX
CC: Hypoxia/COVID        INTERVAL HPI/OVERNIGHT EVENTS: Patient seen and examined. Pulse ox on Venti mask 87-88, placed on 100%NRB. chest PT performed, Proning encouraged. +Cough, +IZQUIERDO. Denies chest pain, nausea, vomiting, fever, chills.     Vital Signs Last 24 Hrs  T(C): 36.6 (09 Mar 2021 10:48), Max: 36.9 (09 Mar 2021 05:50)  T(F): 97.8 (09 Mar 2021 10:48), Max: 98.4 (09 Mar 2021 05:50)  HR: 80 (09 Mar 2021 10:48) (76 - 87)  BP: 113/78 (09 Mar 2021 10:48) (108/75 - 118/81)  BP(mean): --  RR: 20 (09 Mar 2021 10:48) (18 - 20)  SpO2: 92% (09 Mar 2021 10:48) (90% - 92%)    PHYSICAL EXAM:    General: Well developed; obese; in no acute distress  Eyes: PERRLA, EOMI; conjunctiva and sclera clear  Head: Normocephalic; atraumatic  ENMT: No nasal discharge; airway clear  Neck: Supple; non tender; no JVD  Respiratory: Decrease BS B/L otherwise clear  Cardiovascular: Regular rate and rhythm. S1 and S2 Normal; No murmurs, gallops or rubs  Gastrointestinal: Soft non-tender non-distended; Normal bowel sounds  Extremities: Normal range of motion, No clubbing, cyanosis or edema  Vascular: Peripheral pulses palpable 2+ bilaterally  Neurological: Alert and oriented x4, non focal  Psychiatric: Cooperative and appropriate  I&O's Detail                                12.7   1.52  )-----------( 175      ( 08 Mar 2021 07:13 )             37.6     09 Mar 2021 07:18    x      |  x      |  x      ----------------------------<  x      x       |  x      |  0.39     Ca    8.0        08 Mar 2021 07:11  Phos  3.2       08 Mar 2021 07:11  Mg     2.2       08 Mar 2021 07:11    TPro  6.5    /  Alb  3.3    /  TBili  0.4    /  DBili  0.1    /  AST  49     /  ALT  52     /  AlkPhos  53     09 Mar 2021 07:18    PT/INR - ( 09 Mar 2021 07:18 )   PT: 13.0 sec;   INR: 1.13 ratio           CAPILLARY BLOOD GLUCOSE      POCT Blood Glucose.: 122 mg/dL (09 Mar 2021 08:41)  POCT Blood Glucose.: 127 mg/dL (08 Mar 2021 21:42)  POCT Blood Glucose.: 109 mg/dL (08 Mar 2021 17:28)    LIVER FUNCTIONS - ( 09 Mar 2021 07:18 )  Alb: 3.3 g/dL / Pro: 6.5 g/dL / ALK PHOS: 53 U/L / ALT: 52 U/L / AST: 49 U/L / GGT: x               MEDICATIONS  (STANDING):  ascorbic acid 500 milliGRAM(s) Oral two times a day  benzonatate 100 milliGRAM(s) Oral three times a day  cholecalciferol 2000 Unit(s) Oral daily  dexAMETHasone  Injectable 6 milliGRAM(s) IV Push daily  dextrose 40% Gel 15 Gram(s) Oral once  dextrose 5%. 1000 milliLiter(s) (50 mL/Hr) IV Continuous <Continuous>  dextrose 5%. 1000 milliLiter(s) (100 mL/Hr) IV Continuous <Continuous>  dextrose 50% Injectable 25 Gram(s) IV Push once  dextrose 50% Injectable 12.5 Gram(s) IV Push once  dextrose 50% Injectable 25 Gram(s) IV Push once  enoxaparin Injectable 40 milliGRAM(s) SubCutaneous every 12 hours  famotidine    Tablet 20 milliGRAM(s) Oral two times a day  glucagon  Injectable 1 milliGRAM(s) IntraMuscular once  insulin lispro (ADMELOG) corrective regimen sliding scale   SubCutaneous Before meals and at bedtime  loratadine 10 milliGRAM(s) Oral daily  multivitamin 1 Tablet(s) Oral daily  remdesivir  IVPB   IV Intermittent   remdesivir  IVPB 100 milliGRAM(s) IV Intermittent every 24 hours  sodium chloride 0.9% lock flush 3 milliLiter(s) IV Push every 8 hours  tocilizumab IVPB 800 milliGRAM(s) IV Intermittent once  zinc sulfate 220 milliGRAM(s) Oral daily    MEDICATIONS  (PRN):  acetaminophen   Tablet .. 650 milliGRAM(s) Oral every 6 hours PRN Temp greater or equal to 38C (100.4F), Mild Pain (1 - 3)  ondansetron Injectable 4 milliGRAM(s) IV Push every 6 hours PRN Nausea      RADIOLOGY & ADDITIONAL TESTS:

## 2021-03-09 NOTE — PROGRESS NOTE ADULT - ASSESSMENT
1- Hypoxic resp failure due to Covid PNA- now on 100% NRB  cont remdesevir and decadron  ID following, discussed, will add Actemra  chest PT   encourage prone   cont supportive therapy   repeat LFT's and inflamatory markers in am    DVT prophylaxis lovenox 40 mg bid   GI ppx: Pepcid   This 36 y/o female who denies any known pmhx who has been home with her daughter and  c/o fever, cough, muscle aches. 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 She was given Zithromax by that office and on Friday and took 2 doses but it did not help her symptoms. She came to the ED when her home pulse oximeter showed she was saturating 90 on RA. In the ED noted to be 88% RA, goes up to 95% on 6 liters NC. Inflammatory markers elevated, COVID positive  CXR with diffuse intersitial infiltrates. Admitted iv decadron and remdesevir started , ID consulted , overnight pt iw woit worsening hypoxia on  %       1- Hypoxic resp failure due to Covid PNA- now on 100% NRB  cont remdesevir and decadron  ID following, discussed, will add Actemra  chest PT   encourage prone and incentive spirometry   cont supportive therapy   repeat , CMP , CBC and inflamatory markers in am    DVT prophylaxis lovenox 40 mg bid   GI ppx: Pepcid

## 2021-03-09 NOTE — PROGRESS NOTE ADULT - SUBJECTIVE AND OBJECTIVE BOX
[facility]  INFECTIOUS DISEASES AND INTERNAL MEDICINE at [place]  =======================================================  Lincoln Donohue MD  Diplomates American Board of Internal Medicine and Infectious Diseases  Tel  [phone]  Fax [phone]  =======================================================    N-[id number]  BOBBY TREVINO   follow up: COVID-19    patient still requirements  from nasal cannula to Non-rebreather mask         Social Hx:  =======  no toxic habits currently    FAMILY HISTORY:  no significant family history of immunosuppressive disorders in mother or father   =======================================================    REVIEW OF SYSTEMS:  CONSTITUTIONAL:  as per HPI  HEENT:  No diplopia or blurred vision.  No earache, sore throat or runny nose.  CARDIOVASCULAR:  No pressure, squeezing, strangling, tightness, heaviness or aching about the chest, neck, axilla or epigastrium.  RESPIRATORY:  as per HPI  GASTROINTESTINAL:  No nausea, vomiting or diarrhea.  GENITOURINARY:  No dysuria, frequency or urgency. No Blood in urine  MUSCULOSKELETAL:  no joint aches, no muscle pain  SKIN:  No change in skin, hair or nails.  NEUROLOGIC:  No Headaches, seizures or weakness.  PSYCHIATRIC:  No disorder of thought or mood.  ENDOCRINE:  No heat or cold intolerance  HEMATOLOGICAL:  No easy bruising or bleeding.    =======================================================  Allergies  No Known Allergies       ======================================================  Physical Exam:  ============     General:  No acute distress.  OBESE  Eye: Pupils are equal, round and reactive to light, Extraocular movements are intact, Normal conjunctiva.  HENT: Normocephalic, Oral mucosa is moist, No pharyngeal erythema, No sinus tenderness.  ON SUPPLEMENTAL OXYGEN  Neck: Supple, No lymphadenopathy.  Respiratory: Lungs  with COARSE breath sounds; worse at bases  Cardiovascular: Normal rate, Regular rhythm,   Gastrointestinal: Soft, Non-tender, Non-distended, Normal bowel sounds.  Genitourinary: No costovertebral angle tenderness.  Lymphatics: No lymphadenopathy neck,   Musculoskeletal: Normal range of motion, Normal strength.  Integumentary: No rash.  Neurologic: Alert, Oriented, No focal deficits, Cranial Nerves II-XII are grossly intact.  Psychiatric: Appropriate mood & affect.    =======================================================  Vitals:  ============  T(F): 97.8 (09 Mar 2021 10:48), Max: 98.4 (09 Mar 2021 05:50)  HR: 80 (09 Mar 2021 10:48)  BP: 113/78 (09 Mar 2021 10:48)  RR: 20 (09 Mar 2021 10:48)  SpO2: 92% (09 Mar 2021 10:48) (90% - 92%)  temp max in last 48H T(F): , Max: 100 (03-07-21 @ 18:29)    =======================================================  Current Antibiotics:  remdesivir  IVPB 100 milliGRAM(s) IV Intermittent every 24 hours    Other medications:  ascorbic acid 500 milliGRAM(s) Oral two times a day  benzonatate 100 milliGRAM(s) Oral three times a day  cholecalciferol 2000 Unit(s) Oral daily  dexAMETHasone  Injectable 6 milliGRAM(s) IV Push daily  dextrose 40% Gel 15 Gram(s) Oral once  dextrose 5%. 1000 milliLiter(s) IV Continuous <Continuous>  dextrose 5%. 1000 milliLiter(s) IV Continuous <Continuous>  dextrose 50% Injectable 25 Gram(s) IV Push once  dextrose 50% Injectable 12.5 Gram(s) IV Push once  dextrose 50% Injectable 25 Gram(s) IV Push once  enoxaparin Injectable 40 milliGRAM(s) SubCutaneous every 12 hours  famotidine    Tablet 20 milliGRAM(s) Oral two times a day  glucagon  Injectable 1 milliGRAM(s) IntraMuscular once  insulin lispro (ADMELOG) corrective regimen sliding scale   SubCutaneous Before meals and at bedtime  loratadine 10 milliGRAM(s) Oral daily  multivitamin 1 Tablet(s) Oral daily  sodium chloride 0.9% lock flush 3 milliLiter(s) IV Push every 8 hours  tocilizumab IVPB 800 milliGRAM(s) IV Intermittent once  zinc sulfate 220 milliGRAM(s) Oral daily      =======================================================  Labs:                        12.8   2.75  )-----------( 243      ( 09 Mar 2021 13:56 )             38.9     03-09    x   |  x   |  x   ----------------------------<  x   x    |  x   |  0.39<L>    Ca    8.0<L>      08 Mar 2021 07:11  Phos  3.2     03-08  Mg     2.2     03-08    TPro  6.5<L>  /  Alb  3.3  /  TBili  0.4  /  DBili  0.1  /  AST  49<H>  /  ALT  52<H>  /  AlkPhos  53  03-09    C-Reactive Protein, Serum: 125 mg/L (03-07-21 @ 20:15)    Procalcitonin, Serum: 0.13 ng/mL (03-07-21 @ 20:15)    COVID-19 IgG Antibody Index: 3.71 Index (03-08-21 @ 13:11)  COVID-19 IgG Antibody Interpretation: Positive (03-08-21 @ 13:11)  COVID-19 PCR: Detected (03-07-21 @ 20:29)    < from: Xray Chest 1 View-PORTABLE IMMEDIATE (03.07.21 @ 19:51) >   EXAM:  XR CHEST PORTABLE IMMED 1V                          PROCEDURE DATE:  03/07/2021          INTERPRETATION:  PROCEDURE: AP view of the chest.    CLINICAL INFORMATION: Shortness of breath, cough and fever.    COMPARISON: None.    FINDINGS:    Lungs: There are bilateral patchy airspace opacities most prominent at the lung bases.  Heart: The heart is normal in size.  Mediastinum: The mediastinum is within normal limits.    IMPRESSION:  Bilateral patchy airspace opacities, most prominent at the lung bases, compatible with multifocal pneumonia. Follow-up to resolution is recommended.         NIRANJAN GUEVARA MD; Attending Radiologist  This document has been electronically signed. Mar  8 2021  8:07AM    < end of copied text >

## 2021-03-10 LAB
ALBUMIN SERPL ELPH-MCNC: 3.4 G/DL — SIGNIFICANT CHANGE UP (ref 3.3–5.2)
ALP SERPL-CCNC: 61 U/L — SIGNIFICANT CHANGE UP (ref 40–120)
ALT FLD-CCNC: 73 U/L — HIGH
AST SERPL-CCNC: 55 U/L — HIGH
BILIRUB DIRECT SERPL-MCNC: 0.1 MG/DL — SIGNIFICANT CHANGE UP (ref 0–0.3)
BILIRUB INDIRECT FLD-MCNC: 0.3 MG/DL — SIGNIFICANT CHANGE UP (ref 0.2–1)
BILIRUB SERPL-MCNC: 0.4 MG/DL — SIGNIFICANT CHANGE UP (ref 0.4–2)
CREAT SERPL-MCNC: 0.44 MG/DL — LOW (ref 0.5–1.3)
CRP SERPL-MCNC: 25 MG/L — HIGH
D DIMER BLD IA.RAPID-MCNC: 258 NG/ML DDU — HIGH
FERRITIN SERPL-MCNC: 630 NG/ML — HIGH (ref 15–150)
GLUCOSE BLDC GLUCOMTR-MCNC: 108 MG/DL — HIGH (ref 70–99)
GLUCOSE BLDC GLUCOMTR-MCNC: 111 MG/DL — HIGH (ref 70–99)
GLUCOSE BLDC GLUCOMTR-MCNC: 112 MG/DL — HIGH (ref 70–99)
GLUCOSE BLDC GLUCOMTR-MCNC: 116 MG/DL — HIGH (ref 70–99)
INR BLD: 1.12 RATIO — SIGNIFICANT CHANGE UP (ref 0.88–1.16)
LDH SERPL L TO P-CCNC: 372 U/L — HIGH (ref 98–192)
PROT SERPL-MCNC: 6.2 G/DL — LOW (ref 6.6–8.7)
PROTHROM AB SERPL-ACNC: 12.9 SEC — SIGNIFICANT CHANGE UP (ref 10.6–13.6)

## 2021-03-10 PROCEDURE — 99232 SBSQ HOSP IP/OBS MODERATE 35: CPT

## 2021-03-10 RX ORDER — ALBUTEROL 90 UG/1
2 AEROSOL, METERED ORAL EVERY 6 HOURS
Refills: 0 | Status: DISCONTINUED | OUTPATIENT
Start: 2021-03-10 | End: 2021-03-14

## 2021-03-10 RX ORDER — REMDESIVIR 5 MG/ML
100 INJECTION INTRAVENOUS EVERY 24 HOURS
Refills: 0 | Status: DISCONTINUED | OUTPATIENT
Start: 2021-03-13 | End: 2021-03-14

## 2021-03-10 RX ADMIN — SODIUM CHLORIDE 3 MILLILITER(S): 9 INJECTION INTRAMUSCULAR; INTRAVENOUS; SUBCUTANEOUS at 21:29

## 2021-03-10 RX ADMIN — SODIUM CHLORIDE 3 MILLILITER(S): 9 INJECTION INTRAMUSCULAR; INTRAVENOUS; SUBCUTANEOUS at 05:17

## 2021-03-10 RX ADMIN — ALBUTEROL 2 PUFF(S): 90 AEROSOL, METERED ORAL at 21:07

## 2021-03-10 RX ADMIN — Medication 1 TABLET(S): at 10:11

## 2021-03-10 RX ADMIN — SODIUM CHLORIDE 3 MILLILITER(S): 9 INJECTION INTRAMUSCULAR; INTRAVENOUS; SUBCUTANEOUS at 10:12

## 2021-03-10 RX ADMIN — FAMOTIDINE 20 MILLIGRAM(S): 10 INJECTION INTRAVENOUS at 16:37

## 2021-03-10 RX ADMIN — Medication 500 MILLIGRAM(S): at 05:19

## 2021-03-10 RX ADMIN — Medication 100 MILLIGRAM(S): at 05:19

## 2021-03-10 RX ADMIN — FAMOTIDINE 20 MILLIGRAM(S): 10 INJECTION INTRAVENOUS at 05:19

## 2021-03-10 RX ADMIN — Medication 500 MILLIGRAM(S): at 10:11

## 2021-03-10 RX ADMIN — LORATADINE 10 MILLIGRAM(S): 10 TABLET ORAL at 10:11

## 2021-03-10 RX ADMIN — Medication 2000 UNIT(S): at 10:11

## 2021-03-10 RX ADMIN — ENOXAPARIN SODIUM 40 MILLIGRAM(S): 100 INJECTION SUBCUTANEOUS at 05:19

## 2021-03-10 RX ADMIN — REMDESIVIR 500 MILLIGRAM(S): 5 INJECTION INTRAVENOUS at 05:20

## 2021-03-10 RX ADMIN — Medication 100 MILLIGRAM(S): at 21:29

## 2021-03-10 RX ADMIN — TOCILIZUMAB 100 MILLIGRAM(S): 20 INJECTION, SOLUTION, CONCENTRATE INTRAVENOUS at 10:57

## 2021-03-10 RX ADMIN — Medication 6 MILLIGRAM(S): at 05:18

## 2021-03-10 RX ADMIN — ZINC SULFATE TAB 220 MG (50 MG ZINC EQUIVALENT) 220 MILLIGRAM(S): 220 (50 ZN) TAB at 10:11

## 2021-03-10 RX ADMIN — ENOXAPARIN SODIUM 40 MILLIGRAM(S): 100 INJECTION SUBCUTANEOUS at 16:37

## 2021-03-10 RX ADMIN — Medication 100 MILLIGRAM(S): at 10:11

## 2021-03-10 NOTE — PROGRESS NOTE ADULT - ASSESSMENT
This 36 y/o female who denies any known pmhx who has been home with her daughter and  c/o fever, cough, muscle aches. 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 She was given Zithromax by that office and on Friday and took 2 doses but it did not help her symptoms. She came to the ED when her home pulse oximeter showed she was saturating 90 on RA. In the ED noted to be 88% RA, goes up to 95% on 6 liters NC. Inflammatory markers elevated, COVID pending. CXR with diffuse intersitial infiltrates.  (07 Mar 2021 23:04)      patient still has SOB. on oxygen, Currently proning. COVID testing is confirmed positive here.   Xray of the lungs show: Bilateral patchy airspace opacities, most prominent at the lung bases, compatible with multifocal pneumonia. Follow-up to resolution is recommended.    patient is on nasal cannula.    Impression:  COVID-19 infection   Viral pneumonia  shortness of breath  lung infiltrates  dependence on oxygen  Leukopenia    Plan:  - continue Remdesivir IV daily.   change to 10 day course THRU 3/17/2021    - continue dexamethasone 6mg daily, While on remdesivir    Clinically requiring more oxygen  - ORDERED Tocilizumab x 1 dose 3/9.; pending dose administration    Inflammatory markers and LFTs WILL BE ORDERED with the REMDESIVIR order SET.  DO NOT ORDER this additionally.   - trend CBC with diff, CMP  daily    - Continue supportive care measures  - continue Oxygenation as needed;  CONTINUE to titrate down as tolerated  - self- proning  as tolerated  - ENCOURAGED OOB to chair  - encouraged incentive spirometry     Leukopenia  - from viral infection  - improving  - will follow & trend    Will follow with you

## 2021-03-10 NOTE — PROGRESS NOTE ADULT - SUBJECTIVE AND OBJECTIVE BOX
Internal Medicine     Follow up For COVID infection , hypoxia , B/l PNA     pt is seen examined , on VM , feels better today less SOB   no overnight events reported   ID follow up appreciated         ROS   as above     MEDICATIONS  (STANDING):  ascorbic acid 500 milliGRAM(s) Oral two times a day  benzonatate 100 milliGRAM(s) Oral three times a day  cholecalciferol 2000 Unit(s) Oral daily  dexAMETHasone  Injectable 6 milliGRAM(s) IV Push daily  dextrose 40% Gel 15 Gram(s) Oral once  dextrose 5%. 1000 milliLiter(s) (50 mL/Hr) IV Continuous <Continuous>  dextrose 5%. 1000 milliLiter(s) (100 mL/Hr) IV Continuous <Continuous>  dextrose 50% Injectable 25 Gram(s) IV Push once  dextrose 50% Injectable 12.5 Gram(s) IV Push once  dextrose 50% Injectable 25 Gram(s) IV Push once  enoxaparin Injectable 40 milliGRAM(s) SubCutaneous every 12 hours  famotidine    Tablet 20 milliGRAM(s) Oral two times a day  glucagon  Injectable 1 milliGRAM(s) IntraMuscular once  insulin lispro (ADMELOG) corrective regimen sliding scale   SubCutaneous Before meals and at bedtime  loratadine 10 milliGRAM(s) Oral daily  multivitamin 1 Tablet(s) Oral daily  remdesivir  IVPB   IV Intermittent   remdesivir  IVPB 100 milliGRAM(s) IV Intermittent every 24 hours  sodium chloride 0.9% lock flush 3 milliLiter(s) IV Push every 8 hours  zinc sulfate 220 milliGRAM(s) Oral daily    MEDICATIONS  (PRN):  acetaminophen   Tablet .. 650 milliGRAM(s) Oral every 6 hours PRN Temp greater or equal to 38C (100.4F), Mild Pain (1 - 3)  ondansetron Injectable 4 milliGRAM(s) IV Push every 6 hours PRN Nausea      Vital Signs Last 24 Hrs  T(C): 36.6 (10 Mar 2021 15:24), Max: 36.6 (09 Mar 2021 20:46)  T(F): 97.8 (10 Mar 2021 15:24), Max: 97.9 (10 Mar 2021 04:48)  HR: 81 (10 Mar 2021 15:24) (63 - 86)  BP: 119/81 (10 Mar 2021 15:24) (103/70 - 134/85)  BP(mean): --  RR: 20 (10 Mar 2021 15:24) (19 - 20)  SpO2: 93% (10 Mar 2021 15:24) (90% - 93%)      PHYSICAL EXAM:      Constitutional: no  distress  sitting in the bed com,fortable     Respiratory: diminished BS , CTA bilateral     Cardiovascular: regular rate s1 /s2     Gastrointestinal: soft no tenderness , BS positive     Extremities: no pretibial edema                               12.8   2.75  )-----------( 243      ( 09 Mar 2021 13:56 )             38.9   03-10    x   |  x   |  x   ----------------------------<  x   x    |  x   |  0.44<L>      TPro  6.2<L>  /  Alb  3.4  /  TBili  0.4  /  DBili  0.1  /  AST  55<H>  /  ALT  73<H>  /  AlkPhos  61  03-10

## 2021-03-10 NOTE — PROGRESS NOTE ADULT - SUBJECTIVE AND OBJECTIVE BOX
[facility]  INFECTIOUS DISEASES AND INTERNAL MEDICINE at [place]  =======================================================  Lincoln Donohue MD  Diplomates American Board of Internal Medicine and Infectious Diseases  Tel  [phone]  Fax [phone]  =======================================================    MRN-[id number]  BOBBY TREVINO   follow up: COVID-19    remains on venti mask  still with SOB      Social Hx:  =======  no toxic habits currently    FAMILY HISTORY:  no significant family history of immunosuppressive disorders in mother or father   =======================================================    REVIEW OF SYSTEMS:  CONSTITUTIONAL:  as per HPI  HEENT:  No diplopia or blurred vision.  No earache, sore throat or runny nose.  CARDIOVASCULAR:  No pressure, squeezing, strangling, tightness, heaviness or aching about the chest, neck, axilla or epigastrium.  RESPIRATORY:  as per HPI  GASTROINTESTINAL:  No nausea, vomiting or diarrhea.  GENITOURINARY:  No dysuria, frequency or urgency. No Blood in urine  MUSCULOSKELETAL:  no joint aches, no muscle pain  SKIN:  No change in skin, hair or nails.  NEUROLOGIC:  No Headaches, seizures or weakness.  PSYCHIATRIC:  No disorder of thought or mood.  ENDOCRINE:  No heat or cold intolerance  HEMATOLOGICAL:  No easy bruising or bleeding.    =======================================================  Allergies  No Known Allergies       ======================================================  Physical Exam:  ============     General:  No acute distress.  OBESE  Eye: Pupils are equal, round and reactive to light, Extraocular movements are intact, Normal conjunctiva.  HENT: Normocephalic, Oral mucosa is moist, No pharyngeal erythema, No sinus tenderness.  ON SUPPLEMENTAL OXYGEN  Neck: Supple, No lymphadenopathy.  Respiratory: Lungs  with COARSE breath sounds; worse at bases, unchanged  Cardiovascular: Normal rate, Regular rhythm,   Gastrointestinal: Soft, Non-tender, Non-distended, Normal bowel sounds.  Genitourinary: No costovertebral angle tenderness.  Lymphatics: No lymphadenopathy neck,   Musculoskeletal: Normal range of motion, Normal strength.  Integumentary: No rash.  Neurologic: Alert, Oriented, No focal deficits, Cranial Nerves II-XII are grossly intact.  Psychiatric: Appropriate mood & affect.    =======================================================  Vitals:  ============  T(F): 97.9 (10 Mar 2021 04:48), Max: 97.9 (10 Mar 2021 04:48)  HR: 63 (10 Mar 2021 04:48)  BP: 103/70 (10 Mar 2021 04:48)  RR: 19 (10 Mar 2021 04:48)  SpO2: 93% (10 Mar 2021 04:48) (90% - 93%)  temp max in last 48H T(F): , Max: 98.5 (03-08-21 @ 11:45)    =======================================================  Current Antibiotics:  remdesivir  IVPB   IV Intermittent   remdesivir  IVPB 100 milliGRAM(s) IV Intermittent every 24 hours    Other medications:  ascorbic acid 500 milliGRAM(s) Oral two times a day  benzonatate 100 milliGRAM(s) Oral three times a day  cholecalciferol 2000 Unit(s) Oral daily  dexAMETHasone  Injectable 6 milliGRAM(s) IV Push daily  dextrose 40% Gel 15 Gram(s) Oral once  dextrose 5%. 1000 milliLiter(s) IV Continuous <Continuous>  dextrose 5%. 1000 milliLiter(s) IV Continuous <Continuous>  dextrose 50% Injectable 25 Gram(s) IV Push once  dextrose 50% Injectable 12.5 Gram(s) IV Push once  dextrose 50% Injectable 25 Gram(s) IV Push once  enoxaparin Injectable 40 milliGRAM(s) SubCutaneous every 12 hours  famotidine    Tablet 20 milliGRAM(s) Oral two times a day  glucagon  Injectable 1 milliGRAM(s) IntraMuscular once  insulin lispro (ADMELOG) corrective regimen sliding scale   SubCutaneous Before meals and at bedtime  loratadine 10 milliGRAM(s) Oral daily  multivitamin 1 Tablet(s) Oral daily  sodium chloride 0.9% lock flush 3 milliLiter(s) IV Push every 8 hours  zinc sulfate 220 milliGRAM(s) Oral daily      =======================================================  Labs:                        12.8   2.75  )-----------( 243      ( 09 Mar 2021 13:56 )             38.9     03-10    x   |  x   |  x   ----------------------------<  x   x    |  x   |  0.44<L>      TPro  6.2<L>  /  Alb  3.4  /  TBili  0.4  /  DBili  0.1  /  AST  55<H>  /  ALT  73<H>  /  AlkPhos  61  03-10      Culture - Blood (collected 03-07-21 @ 20:17)  Source: .Blood Blood    Culture - Blood (collected 03-07-21 @ 20:17)  Source: .Blood Blood      Creatinine, Serum: 0.44 mg/dL (03-10-21 @ 07:52)  Creatinine, Serum: 0.39 mg/dL (03-09-21 @ 07:18)  Creatinine, Serum: 0.43 mg/dL (03-08-21 @ 07:11)  Creatinine, Serum: 0.50 mg/dL (03-07-21 @ 20:15)    Procalcitonin, Serum: 0.13 ng/mL (03-07-21 @ 20:15)    D-Dimer Assay, Quantitative: 258 ng/mL DDU (03-10-21 @ 07:52)  D-Dimer Assay, Quantitative: 359 ng/mL DDU (03-07-21 @ 20:19)    Ferritin, Serum: 630 ng/mL (03-10-21 @ 07:52)  Ferritin, Serum: 623 ng/mL (03-07-21 @ 20:15)    C-Reactive Protein, Serum: 25 mg/L (03-10-21 @ 07:52)  C-Reactive Protein, Serum: 125 mg/L (03-07-21 @ 20:15)    WBC Count: 2.75 K/uL (03-09-21 @ 13:56)  WBC Count: 1.52 K/uL (03-08-21 @ 07:13)  WBC Count: 2.81 K/uL (03-07-21 @ 20:15)      COVID-19 IgG Antibody Index: 3.71 Index (03-08-21 @ 13:11)  COVID-19 IgG Antibody Interpretation: Positive (03-08-21 @ 13:11)  COVID-19 PCR: Detected (03-07-21 @ 20:29)    Lactate Dehydrogenase, Serum: 372 U/L (03-10-21 @ 07:52)    Alkaline Phosphatase, Serum: 61 U/L (03-10-21 @ 07:52)  Alkaline Phosphatase, Serum: 53 U/L (03-09-21 @ 07:18)  Alkaline Phosphatase, Serum: 57 U/L (03-08-21 @ 07:11)  Alkaline Phosphatase, Serum: 57 U/L (03-07-21 @ 20:15)  Alanine Aminotransferase (ALT/SGPT): 73 U/L (03-10-21 @ 07:52)  Alanine Aminotransferase (ALT/SGPT): 52 U/L (03-09-21 @ 07:18)  Alanine Aminotransferase (ALT/SGPT): 57 U/L (03-08-21 @ 07:11)  Alanine Aminotransferase (ALT/SGPT): 60 U/L (03-07-21 @ 20:15)  Aspartate Aminotransferase (AST/SGOT): 55 U/L (03-10-21 @ 07:52)  Aspartate Aminotransferase (AST/SGOT): 49 U/L (03-09-21 @ 07:18)  Aspartate Aminotransferase (AST/SGOT): 53 U/L (03-08-21 @ 07:11)  Aspartate Aminotransferase (AST/SGOT): 69 U/L (03-07-21 @ 20:15)  Bilirubin Total, Serum: 0.4 mg/dL (03-10-21 @ 07:52)  Bilirubin Total, Serum: 0.4 mg/dL (03-09-21 @ 07:18)  Bilirubin Total, Serum: 0.3 mg/dL (03-08-21 @ 07:11)  Bilirubin Total, Serum: 0.3 mg/dL (03-07-21 @ 20:15)  Bilirubin Direct, Serum: 0.1 mg/dL (03-10-21 @ 07:52)  Bilirubin Direct, Serum: 0.1 mg/dL (03-09-21 @ 07:18)  Bilirubin Direct, Serum: 0.1 mg/dL (03-08-21 @ 07:11)      < from: Xray Chest 1 View-PORTABLE IMMEDIATE (03.07.21 @ 19:51) >   EXAM:  XR CHEST PORTABLE IMMED 1V                          PROCEDURE DATE:  03/07/2021          INTERPRETATION:  PROCEDURE: AP view of the chest.    CLINICAL INFORMATION: Shortness of breath, cough and fever.    COMPARISON: None.    FINDINGS:    Lungs: There are bilateral patchy airspace opacities most prominent at the lung bases.  Heart: The heart is normal in size.  Mediastinum: The mediastinum is within normal limits.    IMPRESSION:  Bilateral patchy airspace opacities, most prominent at the lung bases, compatible with multifocal pneumonia. Follow-up to resolution is recommended.         NIRANJAN GUEVARA MD; Attending Radiologist  This document has been electronically signed. Mar  8 2021  8:07AM    < end of copied text >

## 2021-03-10 NOTE — PROGRESS NOTE ADULT - ASSESSMENT
36 yo F obese with COVID 19 infection with  hypoxia likely due to viral  PNA, she is with worsening hypoxia upgraded to NRB 3/8 night , seen by ID s/p Tocilizumab on 3/9 ,  and also on remdesevir and decadron iv , imflamatory  markers improving     1- Hypoxic respiratory  failure due to Covid 19 infection with PNA    continue Remdesivir IV daily.   change to 10 day course THRU 3/17/2021 per ID   on decadron iv   supportive therapy   proning , breathing exercise   daily BMP and LFT;s   inflamatory markers     ambulate as tolerate   wean off oxygen if able daily       DVT prophylaxis lovenox 40 mg bid

## 2021-03-11 LAB
ALBUMIN SERPL ELPH-MCNC: 3.3 G/DL — SIGNIFICANT CHANGE UP (ref 3.3–5.2)
ALBUMIN SERPL ELPH-MCNC: 3.5 G/DL — SIGNIFICANT CHANGE UP (ref 3.3–5.2)
ALP SERPL-CCNC: 55 U/L — SIGNIFICANT CHANGE UP (ref 40–120)
ALP SERPL-CCNC: 57 U/L — SIGNIFICANT CHANGE UP (ref 40–120)
ALT FLD-CCNC: 94 U/L — HIGH
ALT FLD-CCNC: 95 U/L — HIGH
ANION GAP SERPL CALC-SCNC: 11 MMOL/L — SIGNIFICANT CHANGE UP (ref 5–17)
AST SERPL-CCNC: 58 U/L — HIGH
AST SERPL-CCNC: 59 U/L — HIGH
BASOPHILS # BLD AUTO: 0.01 K/UL — SIGNIFICANT CHANGE UP (ref 0–0.2)
BASOPHILS NFR BLD AUTO: 0.2 % — SIGNIFICANT CHANGE UP (ref 0–2)
BILIRUB DIRECT SERPL-MCNC: 0.2 MG/DL — SIGNIFICANT CHANGE UP (ref 0–0.3)
BILIRUB INDIRECT FLD-MCNC: 0.4 MG/DL — SIGNIFICANT CHANGE UP (ref 0.2–1)
BILIRUB SERPL-MCNC: 0.5 MG/DL — SIGNIFICANT CHANGE UP (ref 0.4–2)
BILIRUB SERPL-MCNC: 0.6 MG/DL — SIGNIFICANT CHANGE UP (ref 0.4–2)
BUN SERPL-MCNC: 26 MG/DL — HIGH (ref 8–20)
CALCIUM SERPL-MCNC: 8.1 MG/DL — LOW (ref 8.6–10.2)
CHLORIDE SERPL-SCNC: 102 MMOL/L — SIGNIFICANT CHANGE UP (ref 98–107)
CO2 SERPL-SCNC: 27 MMOL/L — SIGNIFICANT CHANGE UP (ref 22–29)
CREAT SERPL-MCNC: 0.47 MG/DL — LOW (ref 0.5–1.3)
EOSINOPHIL # BLD AUTO: 0.01 K/UL — SIGNIFICANT CHANGE UP (ref 0–0.5)
EOSINOPHIL NFR BLD AUTO: 0.2 % — SIGNIFICANT CHANGE UP (ref 0–6)
GLUCOSE BLDC GLUCOMTR-MCNC: 106 MG/DL — HIGH (ref 70–99)
GLUCOSE BLDC GLUCOMTR-MCNC: 115 MG/DL — HIGH (ref 70–99)
GLUCOSE BLDC GLUCOMTR-MCNC: 147 MG/DL — HIGH (ref 70–99)
GLUCOSE BLDC GLUCOMTR-MCNC: 99 MG/DL — SIGNIFICANT CHANGE UP (ref 70–99)
GLUCOSE SERPL-MCNC: 99 MG/DL — SIGNIFICANT CHANGE UP (ref 70–99)
HCT VFR BLD CALC: 40.9 % — SIGNIFICANT CHANGE UP (ref 34.5–45)
HGB BLD-MCNC: 13.4 G/DL — SIGNIFICANT CHANGE UP (ref 11.5–15.5)
IMM GRANULOCYTES NFR BLD AUTO: 1.2 % — SIGNIFICANT CHANGE UP (ref 0–1.5)
INR BLD: 1.13 RATIO — SIGNIFICANT CHANGE UP (ref 0.88–1.16)
LYMPHOCYTES # BLD AUTO: 1.57 K/UL — SIGNIFICANT CHANGE UP (ref 1–3.3)
LYMPHOCYTES # BLD AUTO: 36.8 % — SIGNIFICANT CHANGE UP (ref 13–44)
MCHC RBC-ENTMCNC: 29.7 PG — SIGNIFICANT CHANGE UP (ref 27–34)
MCHC RBC-ENTMCNC: 32.8 GM/DL — SIGNIFICANT CHANGE UP (ref 32–36)
MCV RBC AUTO: 90.7 FL — SIGNIFICANT CHANGE UP (ref 80–100)
MONOCYTES # BLD AUTO: 0.53 K/UL — SIGNIFICANT CHANGE UP (ref 0–0.9)
MONOCYTES NFR BLD AUTO: 12.4 % — SIGNIFICANT CHANGE UP (ref 2–14)
NEUTROPHILS # BLD AUTO: 2.1 K/UL — SIGNIFICANT CHANGE UP (ref 1.8–7.4)
NEUTROPHILS NFR BLD AUTO: 49.2 % — SIGNIFICANT CHANGE UP (ref 43–77)
PLATELET # BLD AUTO: 283 K/UL — SIGNIFICANT CHANGE UP (ref 150–400)
POTASSIUM SERPL-MCNC: 4 MMOL/L — SIGNIFICANT CHANGE UP (ref 3.5–5.3)
POTASSIUM SERPL-SCNC: 4 MMOL/L — SIGNIFICANT CHANGE UP (ref 3.5–5.3)
PROT SERPL-MCNC: 6.3 G/DL — LOW (ref 6.6–8.7)
PROT SERPL-MCNC: 6.4 G/DL — LOW (ref 6.6–8.7)
PROTHROM AB SERPL-ACNC: 13 SEC — SIGNIFICANT CHANGE UP (ref 10.6–13.6)
RBC # BLD: 4.51 M/UL — SIGNIFICANT CHANGE UP (ref 3.8–5.2)
RBC # FLD: 14 % — SIGNIFICANT CHANGE UP (ref 10.3–14.5)
SODIUM SERPL-SCNC: 140 MMOL/L — SIGNIFICANT CHANGE UP (ref 135–145)
WBC # BLD: 4.27 K/UL — SIGNIFICANT CHANGE UP (ref 3.8–10.5)
WBC # FLD AUTO: 4.27 K/UL — SIGNIFICANT CHANGE UP (ref 3.8–10.5)

## 2021-03-11 PROCEDURE — 99233 SBSQ HOSP IP/OBS HIGH 50: CPT

## 2021-03-11 PROCEDURE — 99232 SBSQ HOSP IP/OBS MODERATE 35: CPT

## 2021-03-11 RX ADMIN — SODIUM CHLORIDE 3 MILLILITER(S): 9 INJECTION INTRAMUSCULAR; INTRAVENOUS; SUBCUTANEOUS at 19:29

## 2021-03-11 RX ADMIN — Medication 100 MILLIGRAM(S): at 05:17

## 2021-03-11 RX ADMIN — Medication 500 MILLIGRAM(S): at 17:00

## 2021-03-11 RX ADMIN — ZINC SULFATE TAB 220 MG (50 MG ZINC EQUIVALENT) 220 MILLIGRAM(S): 220 (50 ZN) TAB at 11:08

## 2021-03-11 RX ADMIN — FAMOTIDINE 20 MILLIGRAM(S): 10 INJECTION INTRAVENOUS at 05:18

## 2021-03-11 RX ADMIN — Medication 2000 UNIT(S): at 11:08

## 2021-03-11 RX ADMIN — ENOXAPARIN SODIUM 40 MILLIGRAM(S): 100 INJECTION SUBCUTANEOUS at 17:00

## 2021-03-11 RX ADMIN — REMDESIVIR 500 MILLIGRAM(S): 5 INJECTION INTRAVENOUS at 05:18

## 2021-03-11 RX ADMIN — FAMOTIDINE 20 MILLIGRAM(S): 10 INJECTION INTRAVENOUS at 17:00

## 2021-03-11 RX ADMIN — Medication 6 MILLIGRAM(S): at 05:18

## 2021-03-11 RX ADMIN — Medication 1 TABLET(S): at 11:08

## 2021-03-11 RX ADMIN — SODIUM CHLORIDE 3 MILLILITER(S): 9 INJECTION INTRAMUSCULAR; INTRAVENOUS; SUBCUTANEOUS at 15:05

## 2021-03-11 RX ADMIN — Medication 100 MILLIGRAM(S): at 20:36

## 2021-03-11 RX ADMIN — LORATADINE 10 MILLIGRAM(S): 10 TABLET ORAL at 11:08

## 2021-03-11 RX ADMIN — SODIUM CHLORIDE 3 MILLILITER(S): 9 INJECTION INTRAMUSCULAR; INTRAVENOUS; SUBCUTANEOUS at 05:19

## 2021-03-11 RX ADMIN — Medication 500 MILLIGRAM(S): at 05:17

## 2021-03-11 RX ADMIN — ALBUTEROL 2 PUFF(S): 90 AEROSOL, METERED ORAL at 08:55

## 2021-03-11 RX ADMIN — ENOXAPARIN SODIUM 40 MILLIGRAM(S): 100 INJECTION SUBCUTANEOUS at 05:17

## 2021-03-11 RX ADMIN — Medication 100 MILLIGRAM(S): at 15:04

## 2021-03-11 NOTE — PROGRESS NOTE ADULT - ASSESSMENT
34 yo F obese with COVID 19 infection with  hypoxia likely due to viral  PNA, she is with worsening hypoxia upgraded to NRB 3/8 night , seen by ID s/p Tocilizumab on 3/9 ,  and also on remdesevir and decadron iv , inflammatory  markers improving     1- Hypoxic respiratory  failure due to COVID 19 infection with PNA    continue Remdesivir IV daily.     change to 10 day course THRU 3/17/2021 per ID   on decadron iv   supportive therapy   Proning , breathing exercise   daily BMP and LFT;s   inflamatory markers     Ambulate as tolerate   wean off oxygen if able daily     DVT prophylaxis lovenox 40 mg bid

## 2021-03-11 NOTE — PROGRESS NOTE ADULT - ASSESSMENT
This 34 y/o female who denies any known pmhx who has been home with her daughter and  c/o fever, cough, muscle aches. 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 She was given Zithromax by that office and on Friday and took 2 doses but it did not help her symptoms. She came to the ED when her home pulse oximeter showed she was saturating 90 on RA. In the ED noted to be 88% RA, goes up to 95% on 6 liters NC. Inflammatory markers elevated, COVID pending. CXR with diffuse intersitial infiltrates.  (07 Mar 2021 23:04)      patient still has SOB. on oxygen, Currently proning. COVID testing is confirmed positive here.   Xray of the lungs show: Bilateral patchy airspace opacities, most prominent at the lung bases, compatible with multifocal pneumonia. Follow-up to resolution is recommended.    patient is on nasal cannula.    Impression:  COVID-19 infection   Viral pneumonia  shortness of breath  lung infiltrates  dependence on oxygen  Leukopenia    Plan:  - continue Remdesivir IV daily.   change to 10 day course THRU 3/17/2021    - continue dexamethasone 6mg daily, While on remdesivir  Clinically with elevated oxygen requirements    - ORDERED Tocilizumab x 1 dose 3/9.; given 3/10/21    Inflammatory markers and LFTs WILL BE ORDERED with the REMDESIVIR order SET.  DO NOT ORDER this additionally.   - trend CBC with diff, CMP  daily    - Continue supportive care measures  - continue Oxygenation as needed;  CONTINUE to titrate down as tolerated  - self- proning  as tolerated  - ENCOURAGED OOB to chair  - encouraged incentive spirometry     Leukopenia  - resolved  - from viral infection   - will follow & trend    Will follow with you

## 2021-03-11 NOTE — PROGRESS NOTE ADULT - SUBJECTIVE AND OBJECTIVE BOX
BOBBY TREVINO Patient is a 35y old  Female who presents with a chief complaint of Hypoxia  R/O COVID (10 Mar 2021 15:44)     HPI:  34 y/o female who denies any known pmhx who has been home with her daughter and  c/o fever, cough, muscle aches. 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 She was given Zithromax by that office and on Friday and took 2 doses but it did not help her symptoms. She came to the ED when her home pulse oximeter showed she was saturating 90 on RA. In the ED noted to be 88% RA, goes up to 95% on 6 liters NC. Inflammatory markers elevated, COVID pending. CXR with diffuse intersitial infiltrates.  (07 Mar 2021 23:04)    The patient was seen and evaluated improving cough and SOB   The patient is in no acute distress.  Denied any fever chest pain, palpitations, abdominal pain, fever, dysuria,  edema       I&O's Summary    Allergies    No Known Allergies    Intolerances      HEALTH ISSUES - PROBLEM Dx:  2019 novel coronavirus disease (COVID-19)  2019 novel coronavirus disease (COVID-19)    Acute respiratory failure with hypoxia  Acute respiratory failure with hypoxia          PAST MEDICAL & SURGICAL HISTORY:  No pertinent past medical history    No significant past surgical history            Vital Signs Last 24 Hrs  T(C): 37.1 (11 Mar 2021 16:27), Max: 37.1 (11 Mar 2021 16:27)  T(F): 98.7 (11 Mar 2021 16:27), Max: 98.7 (11 Mar 2021 16:27)  HR: 77 (11 Mar 2021 16:27) (61 - 80)  BP: 103/69 (11 Mar 2021 16:27) (100/64 - 130/80)  BP(mean): --  RR: 20 (11 Mar 2021 16:27) (18 - 20)  SpO2: 96% (11 Mar 2021 16:27) (92% - 96%)T(C): 37.1 (03-11-21 @ 16:27), Max: 37.1 (03-11-21 @ 16:27)  HR: 77 (03-11-21 @ 16:27) (61 - 80)  BP: 103/69 (03-11-21 @ 16:27) (100/64 - 130/80)  RR: 20 (03-11-21 @ 16:27) (18 - 20)  SpO2: 96% (03-11-21 @ 16:27) (92% - 96%)  Wt(kg): --    PHYSICAL EXAM:    GENERAL: NAD,   HEAD:  Atraumatic, Normocephalic  NERVOUS SYSTEM:  Alert & Oriented X3,  Moves upper and lower extremities; CNS-II-XII  CHEST/LUNG: Clear to auscultation bilaterally; No rales, rhonchi, wheezing,   HEART: Regular rate and rhythm; No murmurs,   ABDOMEN: Soft, Nontender, Nondistended; Bowel sounds present  EXTREMITIES:  Peripheral Pulses, No  cyanosis, or edema      acetaminophen   Tablet .. 650 milliGRAM(s) Oral every 6 hours PRN  ALBUTerol    90 MICROgram(s) HFA Inhaler 2 Puff(s) Inhalation every 6 hours  ascorbic acid 500 milliGRAM(s) Oral two times a day  benzonatate 100 milliGRAM(s) Oral three times a day  cholecalciferol 2000 Unit(s) Oral daily  dexAMETHasone  Injectable 6 milliGRAM(s) IV Push daily  dextrose 40% Gel 15 Gram(s) Oral once  dextrose 5%. 1000 milliLiter(s) IV Continuous <Continuous>  dextrose 5%. 1000 milliLiter(s) IV Continuous <Continuous>  dextrose 50% Injectable 25 Gram(s) IV Push once  dextrose 50% Injectable 12.5 Gram(s) IV Push once  dextrose 50% Injectable 25 Gram(s) IV Push once  enoxaparin Injectable 40 milliGRAM(s) SubCutaneous every 12 hours  famotidine    Tablet 20 milliGRAM(s) Oral two times a day  glucagon  Injectable 1 milliGRAM(s) IntraMuscular once  insulin lispro (ADMELOG) corrective regimen sliding scale   SubCutaneous Before meals and at bedtime  loratadine 10 milliGRAM(s) Oral daily  multivitamin 1 Tablet(s) Oral daily  ondansetron Injectable 4 milliGRAM(s) IV Push every 6 hours PRN  remdesivir  IVPB   IV Intermittent   remdesivir  IVPB 100 milliGRAM(s) IV Intermittent every 24 hours  sodium chloride 0.9% lock flush 3 milliLiter(s) IV Push every 8 hours  zinc sulfate 220 milliGRAM(s) Oral daily      LABS:                          13.4   4.27  )-----------( 283      ( 11 Mar 2021 07:16 )             40.9     03-11    140  |  102  |  26.0<H>  ----------------------------<  99  4.0   |  27.0  |  0.47<L>    Ca    8.1<L>      11 Mar 2021 07:16    TPro  6.4<L>  /  Alb  3.5  /  TBili  0.5  /  DBili  0.2  /  AST  59<H>  /  ALT  95<H>  /  AlkPhos  57  03-11    LIVER FUNCTIONS - ( 11 Mar 2021 07:16 )  Alb: 3.5 g/dL / Pro: 6.4 g/dL / ALK PHOS: 57 U/L / ALT: 95 U/L / AST: 59 U/L / GGT: x           PT/INR - ( 11 Mar 2021 07:16 )   PT: 13.0 sec;   INR: 1.13 ratio                 CAPILLARY BLOOD GLUCOSE      POCT Blood Glucose.: 115 mg/dL (11 Mar 2021 11:07)  POCT Blood Glucose.: 106 mg/dL (11 Mar 2021 08:11)  POCT Blood Glucose.: 111 mg/dL (10 Mar 2021 21:28)      RADIOLOGY & ADDITIONAL TESTS:      Consultant notes reviewed    Case discussed with consultant/provider/ family /patient

## 2021-03-11 NOTE — PROGRESS NOTE ADULT - SUBJECTIVE AND OBJECTIVE BOX
[facility]  INFECTIOUS DISEASES AND INTERNAL MEDICINE at [place]  =======================================================  Lincoln Donohue MD  Diplomates American Board of Internal Medicine and Infectious Diseases  Tel  [phone]  Fax [phone]  =======================================================    MRN-[id number]  BOBBY TREVINO   follow up: COVID-19    remains on venti mask  still with SOB  transferred to 4 bracket overnight    Social Hx:  =======  no toxic habits currently    FAMILY HISTORY:  no significant family history of immunosuppressive disorders in mother or father   =======================================================    REVIEW OF SYSTEMS:  CONSTITUTIONAL:  as per HPI  HEENT:  No diplopia or blurred vision.  No earache, sore throat or runny nose.  CARDIOVASCULAR:  No pressure, squeezing, strangling, tightness, heaviness or aching about the chest, neck, axilla or epigastrium.  RESPIRATORY:  as per HPI  GASTROINTESTINAL:  No nausea, vomiting or diarrhea.  GENITOURINARY:  No dysuria, frequency or urgency. No Blood in urine  MUSCULOSKELETAL:  no joint aches, no muscle pain  SKIN:  No change in skin, hair or nails.  NEUROLOGIC:  No Headaches, seizures or weakness.  PSYCHIATRIC:  No disorder of thought or mood.  ENDOCRINE:  No heat or cold intolerance  HEMATOLOGICAL:  No easy bruising or bleeding.    =======================================================  Allergies  No Known Allergies       ======================================================  Physical Exam:  ============     General:  No acute distress.  OBESE  Eye: Pupils are equal, round and reactive to light, Extraocular movements are intact, Normal conjunctiva.  HENT: Normocephalic, Oral mucosa is moist, No pharyngeal erythema, No sinus tenderness.  ON SUPPLEMENTAL OXYGEN  Neck: Supple, No lymphadenopathy.  Respiratory: Lungs  with COARSE breath sounds; worse at bases, unchanged  Cardiovascular: Normal rate, Regular rhythm,   Gastrointestinal: Soft, Non-tender, Non-distended, Normal bowel sounds.  Genitourinary: No costovertebral angle tenderness.  Lymphatics: No lymphadenopathy neck,   Musculoskeletal: Normal range of motion, Normal strength.  Integumentary: No rash.  Neurologic: Alert, Oriented, No focal deficits, Cranial Nerves II-XII are grossly intact.  Psychiatric: Appropriate mood & affect.    =======================================================  Vitals:  ============  T(F): 97.7 (11 Mar 2021 08:21), Max: 97.8 (10 Mar 2021 15:24)  HR: 72 (11 Mar 2021 08:21)  BP: 105/70 (11 Mar 2021 08:21)  RR: 20 (11 Mar 2021 08:21)  SpO2: 92% (11 Mar 2021 08:21) (92% - 95%)  temp max in last 48H T(F): , Max: 97.9 (03-10-21 @ 04:48)    =======================================================  Current Antibiotics:  remdesivir  IVPB   IV Intermittent   remdesivir  IVPB 100 milliGRAM(s) IV Intermittent every 24 hours    Other medications:  ALBUTerol    90 MICROgram(s) HFA Inhaler 2 Puff(s) Inhalation every 6 hours  ascorbic acid 500 milliGRAM(s) Oral two times a day  benzonatate 100 milliGRAM(s) Oral three times a day  cholecalciferol 2000 Unit(s) Oral daily  dexAMETHasone  Injectable 6 milliGRAM(s) IV Push daily  dextrose 40% Gel 15 Gram(s) Oral once  dextrose 5%. 1000 milliLiter(s) IV Continuous <Continuous>  dextrose 5%. 1000 milliLiter(s) IV Continuous <Continuous>  dextrose 50% Injectable 25 Gram(s) IV Push once  dextrose 50% Injectable 12.5 Gram(s) IV Push once  dextrose 50% Injectable 25 Gram(s) IV Push once  enoxaparin Injectable 40 milliGRAM(s) SubCutaneous every 12 hours  famotidine    Tablet 20 milliGRAM(s) Oral two times a day  glucagon  Injectable 1 milliGRAM(s) IntraMuscular once  insulin lispro (ADMELOG) corrective regimen sliding scale   SubCutaneous Before meals and at bedtime  loratadine 10 milliGRAM(s) Oral daily  multivitamin 1 Tablet(s) Oral daily  sodium chloride 0.9% lock flush 3 milliLiter(s) IV Push every 8 hours  zinc sulfate 220 milliGRAM(s) Oral daily      =======================================================  Labs:                        13.4   4.27  )-----------( 283      ( 11 Mar 2021 07:16 )             40.9     03-11    140  |  102  |  26.0<H>  ----------------------------<  99  4.0   |  27.0  |  0.47<L>    Ca    8.1<L>      11 Mar 2021 07:16    TPro  6.4<L>  /  Alb  3.5  /  TBili  0.5  /  DBili  0.2  /  AST  59<H>  /  ALT  95<H>  /  AlkPhos  57  03-11      Culture - Blood (collected 03-07-21 @ 20:17)  Source: .Blood Blood    Culture - Blood (collected 03-07-21 @ 20:17)  Source: .Blood Blood        C-Reactive Protein, Serum: 25 mg/L (03-10-21 @ 07:52)  C-Reactive Protein, Serum: 125 mg/L (03-07-21 @ 20:15)      Procalcitonin, Serum: 0.13 ng/mL (03-07-21 @ 20:15)      COVID-19 IgG Antibody Index: 3.71 Index (03-08-21 @ 13:11)  COVID-19 IgG Antibody Interpretation: Positive (03-08-21 @ 13:11)  COVID-19 PCR: Detected (03-07-21 @ 20:29)

## 2021-03-12 LAB
ALBUMIN SERPL ELPH-MCNC: 3.3 G/DL — SIGNIFICANT CHANGE UP (ref 3.3–5.2)
ALP SERPL-CCNC: 55 U/L — SIGNIFICANT CHANGE UP (ref 40–120)
ALT FLD-CCNC: 137 U/L — HIGH
AST SERPL-CCNC: 74 U/L — HIGH
BILIRUB DIRECT SERPL-MCNC: 0.1 MG/DL — SIGNIFICANT CHANGE UP (ref 0–0.3)
BILIRUB INDIRECT FLD-MCNC: 0.3 MG/DL — SIGNIFICANT CHANGE UP (ref 0.2–1)
BILIRUB SERPL-MCNC: 0.5 MG/DL — SIGNIFICANT CHANGE UP (ref 0.4–2)
CREAT SERPL-MCNC: 0.44 MG/DL — LOW (ref 0.5–1.3)
CULTURE RESULTS: SIGNIFICANT CHANGE UP
CULTURE RESULTS: SIGNIFICANT CHANGE UP
GLUCOSE BLDC GLUCOMTR-MCNC: 119 MG/DL — HIGH (ref 70–99)
GLUCOSE BLDC GLUCOMTR-MCNC: 134 MG/DL — HIGH (ref 70–99)
GLUCOSE BLDC GLUCOMTR-MCNC: 138 MG/DL — HIGH (ref 70–99)
GLUCOSE BLDC GLUCOMTR-MCNC: 139 MG/DL — HIGH (ref 70–99)
INR BLD: 1.11 RATIO — SIGNIFICANT CHANGE UP (ref 0.88–1.16)
PROT SERPL-MCNC: 6 G/DL — LOW (ref 6.6–8.7)
PROTHROM AB SERPL-ACNC: 12.8 SEC — SIGNIFICANT CHANGE UP (ref 10.6–13.6)
SPECIMEN SOURCE: SIGNIFICANT CHANGE UP
SPECIMEN SOURCE: SIGNIFICANT CHANGE UP

## 2021-03-12 PROCEDURE — 99232 SBSQ HOSP IP/OBS MODERATE 35: CPT

## 2021-03-12 PROCEDURE — 99233 SBSQ HOSP IP/OBS HIGH 50: CPT

## 2021-03-12 RX ADMIN — Medication 100 MILLIGRAM(S): at 04:56

## 2021-03-12 RX ADMIN — ENOXAPARIN SODIUM 40 MILLIGRAM(S): 100 INJECTION SUBCUTANEOUS at 04:56

## 2021-03-12 RX ADMIN — ZINC SULFATE TAB 220 MG (50 MG ZINC EQUIVALENT) 220 MILLIGRAM(S): 220 (50 ZN) TAB at 12:26

## 2021-03-12 RX ADMIN — Medication 500 MILLIGRAM(S): at 04:56

## 2021-03-12 RX ADMIN — SODIUM CHLORIDE 3 MILLILITER(S): 9 INJECTION INTRAMUSCULAR; INTRAVENOUS; SUBCUTANEOUS at 14:30

## 2021-03-12 RX ADMIN — ALBUTEROL 2 PUFF(S): 90 AEROSOL, METERED ORAL at 07:41

## 2021-03-12 RX ADMIN — ALBUTEROL 2 PUFF(S): 90 AEROSOL, METERED ORAL at 04:57

## 2021-03-12 RX ADMIN — Medication 2000 UNIT(S): at 12:27

## 2021-03-12 RX ADMIN — FAMOTIDINE 20 MILLIGRAM(S): 10 INJECTION INTRAVENOUS at 17:28

## 2021-03-12 RX ADMIN — ENOXAPARIN SODIUM 40 MILLIGRAM(S): 100 INJECTION SUBCUTANEOUS at 17:28

## 2021-03-12 RX ADMIN — SODIUM CHLORIDE 3 MILLILITER(S): 9 INJECTION INTRAMUSCULAR; INTRAVENOUS; SUBCUTANEOUS at 04:53

## 2021-03-12 RX ADMIN — REMDESIVIR 500 MILLIGRAM(S): 5 INJECTION INTRAVENOUS at 04:54

## 2021-03-12 RX ADMIN — FAMOTIDINE 20 MILLIGRAM(S): 10 INJECTION INTRAVENOUS at 04:56

## 2021-03-12 RX ADMIN — LORATADINE 10 MILLIGRAM(S): 10 TABLET ORAL at 12:26

## 2021-03-12 RX ADMIN — Medication 100 MILLIGRAM(S): at 17:29

## 2021-03-12 RX ADMIN — SODIUM CHLORIDE 3 MILLILITER(S): 9 INJECTION INTRAMUSCULAR; INTRAVENOUS; SUBCUTANEOUS at 22:11

## 2021-03-12 RX ADMIN — Medication 1 TABLET(S): at 12:26

## 2021-03-12 RX ADMIN — Medication 500 MILLIGRAM(S): at 17:29

## 2021-03-12 RX ADMIN — Medication 6 MILLIGRAM(S): at 04:55

## 2021-03-12 NOTE — PROGRESS NOTE ADULT - ASSESSMENT
This 36 y/o female who denies any known pmhx who has been home with her daughter and  c/o fever, cough, muscle aches. 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 She was given Zithromax by that office and on Friday and took 2 doses but it did not help her symptoms. She came to the ED when her home pulse oximeter showed she was saturating 90 on RA. In the ED noted to be 88% RA, goes up to 95% on 6 liters NC. Inflammatory markers elevated, COVID pending. CXR with diffuse intersitial infiltrates.  (07 Mar 2021 23:04)      patient still has SOB. on oxygen, Currently proning. COVID testing is confirmed positive here.   Xray of the lungs show: Bilateral patchy airspace opacities, most prominent at the lung bases, compatible with multifocal pneumonia. Follow-up to resolution is recommended.    patient is on nasal cannula.    Impression:  COVID-19 infection   Viral pneumonia  shortness of breath  lung infiltrates  dependence on oxygen  Leukopenia    Plan:    - s/p Tocilizumab x 1 dose given 3/10/21    - continue Remdesivir IV daily.   change to 10 day course THRU 3/17/2021    - continue dexamethasone 6mg daily, While on remdesivir    Inflammatory markers and LFTs WILL BE ORDERED with the REMDESIVIR order SET.  DO NOT ORDER this additionally.   - trend CBC with diff, CMP  daily    - Continue supportive care measures  - continue Oxygenation as needed;  CONTINUE to titrate down as tolerated  - self- proning  as tolerated  - ENCOURAGED OOB to chair  - encouraged incentive spirometry     Leukopenia  - resolved  - from viral infection   - will follow & trend    Will follow with you

## 2021-03-12 NOTE — PROGRESS NOTE ADULT - SUBJECTIVE AND OBJECTIVE BOX
BOBBY Banner Patient is a 35y old  Female who presents with a chief complaint of Hypoxia  R/O COVID (12 Mar 2021 13:14)     HPI:  34 y/o female who denies any known pmhx who has been home with her daughter and  c/o fever, cough, muscle aches. 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 She was given Zithromax by that office and on Friday and took 2 doses but it did not help her symptoms. She came to the ED when her home pulse oximeter showed she was saturating 90 on RA. In the ED noted to be 88% RA, goes up to 95% on 6 liters NC. Inflammatory markers elevated, COVID pending. CXR with diffuse intersitial infiltrates.  (07 Mar 2021 23:04)    The patient was seen and evaluated   The patient is in no acute distress.      I&O's Summary    Allergies    No Known Allergies    Intolerances      HEALTH ISSUES - PROBLEM Dx:  2019 novel coronavirus disease (COVID-19)  2019 novel coronavirus disease (COVID-19)    Acute respiratory failure with hypoxia  Acute respiratory failure with hypoxia          PAST MEDICAL & SURGICAL HISTORY:  No pertinent past medical history    No significant past surgical history            Vital Signs Last 24 Hrs  T(C): 36.9 (12 Mar 2021 15:28), Max: 36.9 (12 Mar 2021 15:28)  T(F): 98.4 (12 Mar 2021 15:28), Max: 98.4 (12 Mar 2021 15:28)  HR: 64 (12 Mar 2021 15:28) (64 - 84)  BP: 102/70 (12 Mar 2021 15:28) (102/70 - 108/71)  BP(mean): --  RR: 18 (12 Mar 2021 15:28) (18 - 18)  SpO2: 94% (12 Mar 2021 15:28) (94% - 97%)T(C): 36.9 (03-12-21 @ 15:28), Max: 36.9 (03-12-21 @ 15:28)  HR: 64 (03-12-21 @ 15:28) (64 - 84)  BP: 102/70 (03-12-21 @ 15:28) (102/70 - 108/71)  RR: 18 (03-12-21 @ 15:28) (18 - 18)  SpO2: 94% (03-12-21 @ 15:28) (94% - 97%)  Wt(kg): --    PHYSICAL EXAM:    GENERAL: NAD, well-groomed, well-developed  HEAD:  Atraumatic, Normocephalic  EYES: EOMI, PERRL  NERVOUS SYSTEM:  Alert & Oriented X3,  Moves upper and lower extremities; CNS-II-XII  CHEST/LUNG: Clear to auscultation bilaterally; scattered  rales, rhonchi, wheezing,   HEART: Regular rate and rhythm; No murmurs,   ABDOMEN: Soft, Nontender, Nondistended; Bowel sounds present  EXTREMITIES:  Peripheral Pulses, No  cyanosis, or edema  psychiatry- mood and affect approprite, Insight and judgement intact     acetaminophen   Tablet .. 650 milliGRAM(s) Oral every 6 hours PRN  ALBUTerol    90 MICROgram(s) HFA Inhaler 2 Puff(s) Inhalation every 6 hours  ascorbic acid 500 milliGRAM(s) Oral two times a day  benzonatate 100 milliGRAM(s) Oral three times a day  cholecalciferol 2000 Unit(s) Oral daily  dexAMETHasone  Injectable 6 milliGRAM(s) IV Push daily  dextrose 40% Gel 15 Gram(s) Oral once  dextrose 5%. 1000 milliLiter(s) IV Continuous <Continuous>  dextrose 5%. 1000 milliLiter(s) IV Continuous <Continuous>  dextrose 50% Injectable 25 Gram(s) IV Push once  dextrose 50% Injectable 12.5 Gram(s) IV Push once  dextrose 50% Injectable 25 Gram(s) IV Push once  enoxaparin Injectable 40 milliGRAM(s) SubCutaneous every 12 hours  famotidine    Tablet 20 milliGRAM(s) Oral two times a day  glucagon  Injectable 1 milliGRAM(s) IntraMuscular once  insulin lispro (ADMELOG) corrective regimen sliding scale   SubCutaneous Before meals and at bedtime  loratadine 10 milliGRAM(s) Oral daily  multivitamin 1 Tablet(s) Oral daily  ondansetron Injectable 4 milliGRAM(s) IV Push every 6 hours PRN  sodium chloride 0.9% lock flush 3 milliLiter(s) IV Push every 8 hours  zinc sulfate 220 milliGRAM(s) Oral daily      LABS:                          13.4   4.27  )-----------( 283      ( 11 Mar 2021 07:16 )             40.9     03-12    x   |  x   |  x   ----------------------------<  x   x    |  x   |  0.44<L>    Ca    8.1<L>      11 Mar 2021 07:16    TPro  6.0<L>  /  Alb  3.3  /  TBili  0.5  /  DBili  0.1  /  AST  74<H>  /  ALT  137<H>  /  AlkPhos  55  03-12    LIVER FUNCTIONS - ( 12 Mar 2021 07:19 )  Alb: 3.3 g/dL / Pro: 6.0 g/dL / ALK PHOS: 55 U/L / ALT: 137 U/L / AST: 74 U/L / GGT: x           PT/INR - ( 12 Mar 2021 07:19 )   PT: 12.8 sec;   INR: 1.11 ratio                 CAPILLARY BLOOD GLUCOSE      POCT Blood Glucose.: 139 mg/dL (12 Mar 2021 11:56)  POCT Blood Glucose.: 134 mg/dL (12 Mar 2021 08:26)  POCT Blood Glucose.: 99 mg/dL (11 Mar 2021 20:35)  POCT Blood Glucose.: 147 mg/dL (11 Mar 2021 16:58)      RADIOLOGY & ADDITIONAL TESTS:      Consultant notes reviewed    Case discussed with consultant/provider/ family /patient

## 2021-03-12 NOTE — DIETITIAN INITIAL EVALUATION ADULT. - OTHER INFO
34 y/o female with acute hypoxic respiratory failure due to likely covid PNA. No nutrition hx known, no weight loss hx known

## 2021-03-12 NOTE — DIETITIAN INITIAL EVALUATION ADULT. - ENTER TO (CAL/KG)
Arrives via EMS from [place], with c/o palpitations, SOB, and sweating while standing at the stove.  c/o light headiness and blurred vision that has resolved 30

## 2021-03-12 NOTE — PROGRESS NOTE ADULT - SUBJECTIVE AND OBJECTIVE BOX
[facility]  INFECTIOUS DISEASES AND INTERNAL MEDICINE at [place]  =======================================================  Lincoln Donohue MD  Diplomates American Board of Internal Medicine and Infectious Diseases  Tel  [phone]  Fax [phone]  =======================================================    MRN-[id number]  BOBBY TREVINO   follow up: COVID-19    LESS SOB today  downgraded from Venti mask to nasal cannula      Social Hx:  =======  no toxic habits currently    FAMILY HISTORY:  no significant family history of immunosuppressive disorders in mother or father   =======================================================    REVIEW OF SYSTEMS:  CONSTITUTIONAL:  as per HPI  HEENT:  No diplopia or blurred vision.  No earache, sore throat or runny nose.  CARDIOVASCULAR:  No pressure, squeezing, strangling, tightness, heaviness or aching about the chest, neck, axilla or epigastrium.  RESPIRATORY:  as per HPI  GASTROINTESTINAL:  No nausea, vomiting or diarrhea.  GENITOURINARY:  No dysuria, frequency or urgency. No Blood in urine  MUSCULOSKELETAL:  no joint aches, no muscle pain  SKIN:  No change in skin, hair or nails.  NEUROLOGIC:  No Headaches, seizures or weakness.  PSYCHIATRIC:  No disorder of thought or mood.  ENDOCRINE:  No heat or cold intolerance  HEMATOLOGICAL:  No easy bruising or bleeding.    =======================================================  Allergies  No Known Allergies       ======================================================  Physical Exam:  ============     General:  No acute distress.  OBESE  Eye: Pupils are equal, round and reactive to light, Extraocular movements are intact, Normal conjunctiva.  HENT: Normocephalic, Oral mucosa is moist, No pharyngeal erythema, No sinus tenderness.  ON SUPPLEMENTAL OXYGEN  Neck: Supple, No lymphadenopathy.  Respiratory: Lungs  with COARSE breath sounds; worse at bases, unchanged  Cardiovascular: Normal rate, Regular rhythm,   Gastrointestinal: Soft, Non-tender, Non-distended, Normal bowel sounds.  Genitourinary: No costovertebral angle tenderness.  Lymphatics: No lymphadenopathy neck,   Musculoskeletal: Normal range of motion, Normal strength.  Integumentary: No rash.  Neurologic: Alert, Oriented, No focal deficits, Cranial Nerves II-XII are grossly intact.  Psychiatric: Appropriate mood & affect.    =======================================================    Vitals:  ============  T(F): 97.7 (12 Mar 2021 04:30), Max: 98.7 (11 Mar 2021 16:27)  HR: 84 (12 Mar 2021 04:30)  BP: 104/70 (12 Mar 2021 04:30)  RR: 18 (12 Mar 2021 04:30)  SpO2: 97% (12 Mar 2021 04:30) (95% - 97%)  temp max in last 48H T(F): , Max: 98.7 (03-11-21 @ 16:27)    =======================================================  Current Antibiotics:    Other medications:  ALBUTerol    90 MICROgram(s) HFA Inhaler 2 Puff(s) Inhalation every 6 hours  ascorbic acid 500 milliGRAM(s) Oral two times a day  benzonatate 100 milliGRAM(s) Oral three times a day  cholecalciferol 2000 Unit(s) Oral daily  dexAMETHasone  Injectable 6 milliGRAM(s) IV Push daily  dextrose 40% Gel 15 Gram(s) Oral once  dextrose 5%. 1000 milliLiter(s) IV Continuous <Continuous>  dextrose 5%. 1000 milliLiter(s) IV Continuous <Continuous>  dextrose 50% Injectable 25 Gram(s) IV Push once  dextrose 50% Injectable 12.5 Gram(s) IV Push once  dextrose 50% Injectable 25 Gram(s) IV Push once  enoxaparin Injectable 40 milliGRAM(s) SubCutaneous every 12 hours  famotidine    Tablet 20 milliGRAM(s) Oral two times a day  glucagon  Injectable 1 milliGRAM(s) IntraMuscular once  insulin lispro (ADMELOG) corrective regimen sliding scale   SubCutaneous Before meals and at bedtime  loratadine 10 milliGRAM(s) Oral daily  multivitamin 1 Tablet(s) Oral daily  sodium chloride 0.9% lock flush 3 milliLiter(s) IV Push every 8 hours  zinc sulfate 220 milliGRAM(s) Oral daily      =======================================================  Labs:                        13.4   4.27  )-----------( 283      ( 11 Mar 2021 07:16 )             40.9     03-12    x   |  x   |  x   ----------------------------<  x   x    |  x   |  0.44<L>    Ca    8.1<L>      11 Mar 2021 07:16    TPro  6.0<L>  /  Alb  3.3  /  TBili  0.5  /  DBili  0.1  /  AST  74<H>  /  ALT  137<H>  /  AlkPhos  55  03-12      Culture - Blood (collected 03-07-21 @ 20:17)  Source: .Blood Blood    Culture - Blood (collected 03-07-21 @ 20:17)  Source: .Blood Blood      Creatinine, Serum: 0.44 mg/dL (03-12-21 @ 07:19)  Creatinine, Serum: 0.47 mg/dL (03-11-21 @ 07:16)  Creatinine, Serum: 0.44 mg/dL (03-10-21 @ 07:52)  Creatinine, Serum: 0.39 mg/dL (03-09-21 @ 07:18)  Creatinine, Serum: 0.43 mg/dL (03-08-21 @ 07:11)  Creatinine, Serum: 0.50 mg/dL (03-07-21 @ 20:15)    Procalcitonin, Serum: 0.13 ng/mL (03-07-21 @ 20:15)    D-Dimer Assay, Quantitative: 258 ng/mL DDU (03-10-21 @ 07:52)  D-Dimer Assay, Quantitative: 359 ng/mL DDU (03-07-21 @ 20:19)    Ferritin, Serum: 630 ng/mL (03-10-21 @ 07:52)  Ferritin, Serum: 623 ng/mL (03-07-21 @ 20:15)    C-Reactive Protein, Serum: 25 mg/L (03-10-21 @ 07:52)  C-Reactive Protein, Serum: 125 mg/L (03-07-21 @ 20:15)    WBC Count: 4.27 K/uL (03-11-21 @ 07:16)  WBC Count: 2.75 K/uL (03-09-21 @ 13:56)  WBC Count: 1.52 K/uL (03-08-21 @ 07:13)  WBC Count: 2.81 K/uL (03-07-21 @ 20:15)      COVID-19 IgG Antibody Index: 3.71 Index (03-08-21 @ 13:11)  COVID-19 IgG Antibody Interpretation: Positive (03-08-21 @ 13:11)  COVID-19 PCR: Detected (03-07-21 @ 20:29)    Lactate Dehydrogenase, Serum: 372 U/L (03-10-21 @ 07:52)    Alkaline Phosphatase, Serum: 55 U/L (03-12-21 @ 07:19)  Alkaline Phosphatase, Serum: 57 U/L (03-11-21 @ 07:16)  Alkaline Phosphatase, Serum: 55 U/L (03-11-21 @ 07:16)  Alkaline Phosphatase, Serum: 61 U/L (03-10-21 @ 07:52)  Alkaline Phosphatase, Serum: 53 U/L (03-09-21 @ 07:18)  Alanine Aminotransferase (ALT/SGPT): 137 U/L (03-12-21 @ 07:19)  Alanine Aminotransferase (ALT/SGPT): 95 U/L (03-11-21 @ 07:16)  Alanine Aminotransferase (ALT/SGPT): 94 U/L (03-11-21 @ 07:16)  Alanine Aminotransferase (ALT/SGPT): 73 U/L (03-10-21 @ 07:52)  Alanine Aminotransferase (ALT/SGPT): 52 U/L (03-09-21 @ 07:18)  Aspartate Aminotransferase (AST/SGOT): 74 U/L (03-12-21 @ 07:19)  Aspartate Aminotransferase (AST/SGOT): 59 U/L (03-11-21 @ 07:16)  Aspartate Aminotransferase (AST/SGOT): 58 U/L (03-11-21 @ 07:16)  Aspartate Aminotransferase (AST/SGOT): 55 U/L (03-10-21 @ 07:52)  Aspartate Aminotransferase (AST/SGOT): 49 U/L (03-09-21 @ 07:18)  Bilirubin Total, Serum: 0.5 mg/dL (03-12-21 @ 07:19)  Bilirubin Total, Serum: 0.5 mg/dL (03-11-21 @ 07:16)  Bilirubin Total, Serum: 0.6 mg/dL (03-11-21 @ 07:16)  Bilirubin Total, Serum: 0.4 mg/dL (03-10-21 @ 07:52)  Bilirubin Total, Serum: 0.4 mg/dL (03-09-21 @ 07:18)  Bilirubin Direct, Serum: 0.1 mg/dL (03-12-21 @ 07:19)  Bilirubin Direct, Serum: 0.2 mg/dL (03-11-21 @ 07:16)  Bilirubin Direct, Serum: 0.1 mg/dL (03-10-21 @ 07:52)  Bilirubin Direct, Serum: 0.1 mg/dL (03-09-21 @ 07:18)

## 2021-03-13 LAB
ALBUMIN SERPL ELPH-MCNC: 3.2 G/DL — LOW (ref 3.3–5.2)
ALP SERPL-CCNC: 55 U/L — SIGNIFICANT CHANGE UP (ref 40–120)
ALT FLD-CCNC: 118 U/L — HIGH
AST SERPL-CCNC: 49 U/L — HIGH
BILIRUB DIRECT SERPL-MCNC: 0.1 MG/DL — SIGNIFICANT CHANGE UP (ref 0–0.3)
BILIRUB INDIRECT FLD-MCNC: 0.3 MG/DL — SIGNIFICANT CHANGE UP (ref 0.2–1)
BILIRUB SERPL-MCNC: 0.4 MG/DL — SIGNIFICANT CHANGE UP (ref 0.4–2)
CREAT SERPL-MCNC: 0.49 MG/DL — LOW (ref 0.5–1.3)
FIBRINOGEN AG PPP IA-MCNC: 467 MG/DL — HIGH
GLUCOSE BLDC GLUCOMTR-MCNC: 119 MG/DL — HIGH (ref 70–99)
GLUCOSE BLDC GLUCOMTR-MCNC: 137 MG/DL — HIGH (ref 70–99)
GLUCOSE BLDC GLUCOMTR-MCNC: 138 MG/DL — HIGH (ref 70–99)
GLUCOSE BLDC GLUCOMTR-MCNC: 95 MG/DL — SIGNIFICANT CHANGE UP (ref 70–99)
INR BLD: 1.1 RATIO — SIGNIFICANT CHANGE UP (ref 0.88–1.16)
PROT SERPL-MCNC: 5.8 G/DL — LOW (ref 6.6–8.7)
PROTHROM AB SERPL-ACNC: 12.7 SEC — SIGNIFICANT CHANGE UP (ref 10.6–13.6)

## 2021-03-13 PROCEDURE — 99232 SBSQ HOSP IP/OBS MODERATE 35: CPT

## 2021-03-13 RX ADMIN — Medication 100 MILLIGRAM(S): at 05:20

## 2021-03-13 RX ADMIN — ENOXAPARIN SODIUM 40 MILLIGRAM(S): 100 INJECTION SUBCUTANEOUS at 16:33

## 2021-03-13 RX ADMIN — ENOXAPARIN SODIUM 40 MILLIGRAM(S): 100 INJECTION SUBCUTANEOUS at 07:28

## 2021-03-13 RX ADMIN — FAMOTIDINE 20 MILLIGRAM(S): 10 INJECTION INTRAVENOUS at 05:20

## 2021-03-13 RX ADMIN — Medication 500 MILLIGRAM(S): at 16:33

## 2021-03-13 RX ADMIN — Medication 500 MILLIGRAM(S): at 05:20

## 2021-03-13 RX ADMIN — SODIUM CHLORIDE 3 MILLILITER(S): 9 INJECTION INTRAMUSCULAR; INTRAVENOUS; SUBCUTANEOUS at 22:00

## 2021-03-13 RX ADMIN — SODIUM CHLORIDE 3 MILLILITER(S): 9 INJECTION INTRAMUSCULAR; INTRAVENOUS; SUBCUTANEOUS at 13:20

## 2021-03-13 RX ADMIN — Medication 2000 UNIT(S): at 08:22

## 2021-03-13 RX ADMIN — Medication 1 TABLET(S): at 08:22

## 2021-03-13 RX ADMIN — SODIUM CHLORIDE 3 MILLILITER(S): 9 INJECTION INTRAMUSCULAR; INTRAVENOUS; SUBCUTANEOUS at 07:28

## 2021-03-13 RX ADMIN — LORATADINE 10 MILLIGRAM(S): 10 TABLET ORAL at 08:22

## 2021-03-13 RX ADMIN — FAMOTIDINE 20 MILLIGRAM(S): 10 INJECTION INTRAVENOUS at 16:33

## 2021-03-13 RX ADMIN — ZINC SULFATE TAB 220 MG (50 MG ZINC EQUIVALENT) 220 MILLIGRAM(S): 220 (50 ZN) TAB at 08:23

## 2021-03-13 RX ADMIN — Medication 6 MILLIGRAM(S): at 05:21

## 2021-03-13 RX ADMIN — Medication 100 MILLIGRAM(S): at 21:44

## 2021-03-13 RX ADMIN — Medication 100 MILLIGRAM(S): at 12:22

## 2021-03-13 RX ADMIN — REMDESIVIR 500 MILLIGRAM(S): 5 INJECTION INTRAVENOUS at 05:21

## 2021-03-13 NOTE — PROGRESS NOTE ADULT - SUBJECTIVE AND OBJECTIVE BOX
BOBBY TREVINO  ----------------------------------------  The patient was seen at bedside. Patient with COVID-19 pneumonia and hypoxic respiratory failure. Offers no complaints.    Vital Signs Last 24 Hrs  T(C): 37 (13 Mar 2021 08:44), Max: 37 (12 Mar 2021 22:10)  T(F): 98.6 (13 Mar 2021 08:44), Max: 98.6 (12 Mar 2021 22:10)  HR: 84 (13 Mar 2021 08:44) (64 - 94)  BP: 123/77 (13 Mar 2021 08:44) (91/59 - 123/77)  BP(mean): --  RR: 18 (13 Mar 2021 08:44) (16 - 18)  SpO2: 92% (13 Mar 2021 08:44) (92% - 97%)    CAPILLARY BLOOD GLUCOSE  POCT Blood Glucose.: 95 mg/dL (13 Mar 2021 08:20)  POCT Blood Glucose.: 138 mg/dL (12 Mar 2021 21:16)  POCT Blood Glucose.: 119 mg/dL (12 Mar 2021 17:26)  POCT Blood Glucose.: 139 mg/dL (12 Mar 2021 11:56)    PHYSICAL EXAMINATION:  ----------------------------------------  General appearance: No acute distress, Awake, Alert  HEENT: Normocephalic, Atraumatic, Conjunctiva clear, EOMI  Neck: Supple, No JVD, No tenderness  Lungs: Breath sound equal bilaterally, No wheezes, No rales  Cardiovascular: S1S2, Regular rhythm  Abdomen: Soft, Nontender, Nondistended, No guarding/rebound, Positive bowel sounds  Extremities: No clubbing, No cyanosis, No edema, No calf tenderness  Neuro: Strength equal bilaterally, No tremors  Psychiatric: Appropriate mood, Normal affect    LABORATORY STUDIES:  ----------------------------------------  03-13    x   |  x   |  x   ----------------------------<  x   x    |  x   |  0.49<L>      TPro  5.8<L>  /  Alb  3.2<L>  /  TBili  0.4  /  DBili  0.1  /  AST  49<H>  /  ALT  118<H>  /  AlkPhos  55  03-13    LIVER FUNCTIONS - ( 13 Mar 2021 08:16 )  Alb: 3.2 g/dL / Pro: 5.8 g/dL / ALK PHOS: 55 U/L / ALT: 118 U/L / AST: 49 U/L / GGT: x           PT/INR - ( 13 Mar 2021 08:16 )   PT: 12.7 sec;   INR: 1.10 ratio      MEDICATIONS  (STANDING):  ALBUTerol    90 MICROgram(s) HFA Inhaler 2 Puff(s) Inhalation every 6 hours  ascorbic acid 500 milliGRAM(s) Oral two times a day  benzonatate 100 milliGRAM(s) Oral three times a day  cholecalciferol 2000 Unit(s) Oral daily  dexAMETHasone  Injectable 6 milliGRAM(s) IV Push daily  dextrose 40% Gel 15 Gram(s) Oral once  dextrose 5%. 1000 milliLiter(s) (50 mL/Hr) IV Continuous <Continuous>  dextrose 5%. 1000 milliLiter(s) (100 mL/Hr) IV Continuous <Continuous>  dextrose 50% Injectable 25 Gram(s) IV Push once  dextrose 50% Injectable 12.5 Gram(s) IV Push once  dextrose 50% Injectable 25 Gram(s) IV Push once  enoxaparin Injectable 40 milliGRAM(s) SubCutaneous every 12 hours  famotidine    Tablet 20 milliGRAM(s) Oral two times a day  glucagon  Injectable 1 milliGRAM(s) IntraMuscular once  insulin lispro (ADMELOG) corrective regimen sliding scale   SubCutaneous Before meals and at bedtime  loratadine 10 milliGRAM(s) Oral daily  multivitamin 1 Tablet(s) Oral daily  remdesivir  IVPB 100 milliGRAM(s) IV Intermittent every 24 hours  sodium chloride 0.9% lock flush 3 milliLiter(s) IV Push every 8 hours  zinc sulfate 220 milliGRAM(s) Oral daily    MEDICATIONS  (PRN):  acetaminophen   Tablet .. 650 milliGRAM(s) Oral every 6 hours PRN Temp greater or equal to 38C (100.4F), Mild Pain (1 - 3)  ondansetron Injectable 4 milliGRAM(s) IV Push every 6 hours PRN Nausea      ASSESSMENT / PLAN:  ----------------------------------------  34 yo F obese with COVID 19 infection with  hypoxia likely due to viral  PNA, she is with worsening hypoxia upgraded to NRB 3/8 night , seen by ID s/p Tocilizumab on 3/9 ,  and also on remdesevir and decadron iv , inflammatory  markers improving     Acute hypoxic respiratory failure / COVID-19 pneumonia - On remdesivir and dexamethasone. Also had administration of tocilizumab. Titrating supplemental oxygen. To monitor on ambient air if tolerated. Patient to continue with prone positioning and incentive spirometry.    Transaminitis - To continue monitoring. Remdesivir to be discontinued if the patient's respiratory status continues to improve.

## 2021-03-14 ENCOUNTER — TRANSCRIPTION ENCOUNTER (OUTPATIENT)
Age: 36
End: 2021-03-14

## 2021-03-14 VITALS
SYSTOLIC BLOOD PRESSURE: 98 MMHG | OXYGEN SATURATION: 93 % | TEMPERATURE: 98 F | RESPIRATION RATE: 18 BRPM | HEART RATE: 60 BPM | DIASTOLIC BLOOD PRESSURE: 64 MMHG

## 2021-03-14 LAB
ALBUMIN SERPL ELPH-MCNC: 3.3 G/DL — SIGNIFICANT CHANGE UP (ref 3.3–5.2)
ALP SERPL-CCNC: 56 U/L — SIGNIFICANT CHANGE UP (ref 40–120)
ALT FLD-CCNC: 119 U/L — HIGH
AST SERPL-CCNC: 47 U/L — HIGH
BILIRUB DIRECT SERPL-MCNC: 0.1 MG/DL — SIGNIFICANT CHANGE UP (ref 0–0.3)
BILIRUB INDIRECT FLD-MCNC: 0.3 MG/DL — SIGNIFICANT CHANGE UP (ref 0.2–1)
BILIRUB SERPL-MCNC: 0.4 MG/DL — SIGNIFICANT CHANGE UP (ref 0.4–2)
CREAT SERPL-MCNC: 0.52 MG/DL — SIGNIFICANT CHANGE UP (ref 0.5–1.3)
GLUCOSE BLDC GLUCOMTR-MCNC: 114 MG/DL — HIGH (ref 70–99)
GLUCOSE BLDC GLUCOMTR-MCNC: 85 MG/DL — SIGNIFICANT CHANGE UP (ref 70–99)
INR BLD: 1.06 RATIO — SIGNIFICANT CHANGE UP (ref 0.88–1.16)
PROT SERPL-MCNC: 5.9 G/DL — LOW (ref 6.6–8.7)
PROTHROM AB SERPL-ACNC: 12.3 SEC — SIGNIFICANT CHANGE UP (ref 10.6–13.6)

## 2021-03-14 PROCEDURE — 82565 ASSAY OF CREATININE: CPT

## 2021-03-14 PROCEDURE — 83615 LACTATE (LD) (LDH) ENZYME: CPT

## 2021-03-14 PROCEDURE — 86769 SARS-COV-2 COVID-19 ANTIBODY: CPT

## 2021-03-14 PROCEDURE — 82728 ASSAY OF FERRITIN: CPT

## 2021-03-14 PROCEDURE — 84100 ASSAY OF PHOSPHORUS: CPT

## 2021-03-14 PROCEDURE — 85385 FIBRINOGEN ANTIGEN: CPT

## 2021-03-14 PROCEDURE — U0005: CPT

## 2021-03-14 PROCEDURE — 85610 PROTHROMBIN TIME: CPT

## 2021-03-14 PROCEDURE — 99285 EMERGENCY DEPT VISIT HI MDM: CPT | Mod: 25

## 2021-03-14 PROCEDURE — U0003: CPT

## 2021-03-14 PROCEDURE — 87040 BLOOD CULTURE FOR BACTERIA: CPT

## 2021-03-14 PROCEDURE — 80076 HEPATIC FUNCTION PANEL: CPT

## 2021-03-14 PROCEDURE — 80048 BASIC METABOLIC PNL TOTAL CA: CPT

## 2021-03-14 PROCEDURE — 94640 AIRWAY INHALATION TREATMENT: CPT

## 2021-03-14 PROCEDURE — 85379 FIBRIN DEGRADATION QUANT: CPT

## 2021-03-14 PROCEDURE — 83605 ASSAY OF LACTIC ACID: CPT

## 2021-03-14 PROCEDURE — 96374 THER/PROPH/DIAG INJ IV PUSH: CPT

## 2021-03-14 PROCEDURE — 84145 PROCALCITONIN (PCT): CPT

## 2021-03-14 PROCEDURE — 71045 X-RAY EXAM CHEST 1 VIEW: CPT

## 2021-03-14 PROCEDURE — 36415 COLL VENOUS BLD VENIPUNCTURE: CPT

## 2021-03-14 PROCEDURE — 99239 HOSP IP/OBS DSCHRG MGMT >30: CPT

## 2021-03-14 PROCEDURE — 86140 C-REACTIVE PROTEIN: CPT

## 2021-03-14 PROCEDURE — 83735 ASSAY OF MAGNESIUM: CPT

## 2021-03-14 PROCEDURE — 85025 COMPLETE CBC W/AUTO DIFF WBC: CPT

## 2021-03-14 PROCEDURE — 82962 GLUCOSE BLOOD TEST: CPT

## 2021-03-14 PROCEDURE — 80053 COMPREHEN METABOLIC PANEL: CPT

## 2021-03-14 PROCEDURE — 83036 HEMOGLOBIN GLYCOSYLATED A1C: CPT

## 2021-03-14 PROCEDURE — 93005 ELECTROCARDIOGRAM TRACING: CPT

## 2021-03-14 RX ORDER — AZITHROMYCIN 500 MG/1
1 TABLET, FILM COATED ORAL
Qty: 0 | Refills: 0 | DISCHARGE

## 2021-03-14 RX ORDER — ASPIRIN/CALCIUM CARB/MAGNESIUM 324 MG
1 TABLET ORAL
Qty: 0 | Refills: 0 | DISCHARGE

## 2021-03-14 RX ADMIN — REMDESIVIR 500 MILLIGRAM(S): 5 INJECTION INTRAVENOUS at 06:01

## 2021-03-14 RX ADMIN — Medication 6 MILLIGRAM(S): at 06:00

## 2021-03-14 RX ADMIN — ZINC SULFATE TAB 220 MG (50 MG ZINC EQUIVALENT) 220 MILLIGRAM(S): 220 (50 ZN) TAB at 11:02

## 2021-03-14 RX ADMIN — Medication 100 MILLIGRAM(S): at 06:01

## 2021-03-14 RX ADMIN — Medication 2000 UNIT(S): at 11:03

## 2021-03-14 RX ADMIN — Medication 500 MILLIGRAM(S): at 06:01

## 2021-03-14 RX ADMIN — SODIUM CHLORIDE 3 MILLILITER(S): 9 INJECTION INTRAMUSCULAR; INTRAVENOUS; SUBCUTANEOUS at 06:12

## 2021-03-14 RX ADMIN — LORATADINE 10 MILLIGRAM(S): 10 TABLET ORAL at 11:03

## 2021-03-14 RX ADMIN — Medication 1 TABLET(S): at 11:03

## 2021-03-14 RX ADMIN — FAMOTIDINE 20 MILLIGRAM(S): 10 INJECTION INTRAVENOUS at 06:01

## 2021-03-14 RX ADMIN — ENOXAPARIN SODIUM 40 MILLIGRAM(S): 100 INJECTION SUBCUTANEOUS at 06:00

## 2021-03-14 NOTE — DISCHARGE NOTE NURSING/CASE MANAGEMENT/SOCIAL WORK - PATIENT PORTAL LINK FT
You can access the FollowMyHealth Patient Portal offered by [facility] by registering at the following website: [email or web address]. By joining [facility]’s FollowMyHealth portal, you will also be able to view your health information using other applications (apps) compatible with our system.

## 2021-03-14 NOTE — DISCHARGE NOTE PROVIDER - PROVIDER TOKENS
FREE:[LAST:[Primary Care Physician],PHONE:[(   )    -],FAX:[(   )    -]] FREE:[LAST:[[facility]],PHONE:[(   )    -],FAX:[(   )    -]],FREE:[LAST:[[facility]],PHONE:[(   )    -],FAX:[(   )    -],ADDRESS:[[address] [id number]]]

## 2021-03-14 NOTE — DISCHARGE NOTE PROVIDER - HOSPITAL COURSE
36 y/o female who denies any known pmhx who has been home with her daughter and  c/o fever, cough, muscle aches. 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 She was given Zithromax by that office and on Friday and took 2 doses but it did not help her symptoms. She came to the ED when her home pulse oximeter showed she was saturating 90 on RA. In the ED noted to be 88% RA, goes up to 95% on 6 liters NC. Inflammatory markers elevated, CXR with diffuse intersitial infiltrates.    The patient was found to have COVID-19 pneumonia with initiation of dexamethasone and remdesivir. The patient had slow improvement in the hypoxia and was seen by Infectious Disease in consultation and continued on an extended course of remdesivir and dexamethasone after the initial five day course. Laboratory studies also noted mild transaminitis while the patient was continued on remdesivir. The patient had improvement in the hypoxia and supplemental oxygen was ultimately discontinued. The patient was discharged home with instructions to follow up with her primary care physician for further management.        35 minutes total time

## 2021-03-14 NOTE — PROGRESS NOTE ADULT - SUBJECTIVE AND OBJECTIVE BOX
BOBBY TREVINO  ----------------------------------------  The patient was seen at bedside. Patient with COVID-19 pneumonia. Offers no complaints. Denied chest pain or dyspnea.    Vital Signs Last 24 Hrs  T(C): 36.7 (14 Mar 2021 05:32), Max: 37.1 (13 Mar 2021 16:41)  T(F): 98.1 (14 Mar 2021 05:32), Max: 98.7 (13 Mar 2021 16:41)  HR: 60 (14 Mar 2021 05:32) (60 - 63)  BP: 98/64 (14 Mar 2021 05:32) (98/64 - 103/69)  BP(mean): --  RR: 18 (14 Mar 2021 05:32) (18 - 18)  SpO2: 93% (14 Mar 2021 05:32) (93% - 94%)    CAPILLARY BLOOD GLUCOSE  POCT Blood Glucose.: 85 mg/dL (14 Mar 2021 07:35)  POCT Blood Glucose.: 137 mg/dL (13 Mar 2021 21:43)  POCT Blood Glucose.: 119 mg/dL (13 Mar 2021 16:31)  POCT Blood Glucose.: 138 mg/dL (13 Mar 2021 12:16)    PHYSICAL EXAMINATION:  ----------------------------------------  General appearance: No acute distress, Awake, Alert  HEENT: Normocephalic, Atraumatic, Conjunctiva clear, EOMI  Neck: Supple, No JVD, No tenderness  Lungs: Breath sound equal bilaterally, No wheezes, No rales  Cardiovascular: S1S2, Regular rhythm  Abdomen: Soft, Nontender, Nondistended, No guarding/rebound, Positive bowel sounds  Extremities: No clubbing, No cyanosis, No edema, No calf tenderness  Neuro: Strength equal bilaterally, No tremors  Psychiatric: Appropriate mood, Normal affect    LABORATORY STUDIES:  ----------------------------------------  03-14    x   |  x   |  x   ----------------------------<  x   x    |  x   |  0.52      TPro  5.9<L>  /  Alb  3.3  /  TBili  0.4  /  DBili  0.1  /  AST  47<H>  /  ALT  119<H>  /  AlkPhos  56  03-14    LIVER FUNCTIONS - ( 14 Mar 2021 08:46 )  Alb: 3.3 g/dL / Pro: 5.9 g/dL / ALK PHOS: 56 U/L / ALT: 119 U/L / AST: 47 U/L / GGT: x           PT/INR - ( 14 Mar 2021 08:49 )   PT: 12.3 sec;   INR: 1.06 ratio      MEDICATIONS  (STANDING):  ALBUTerol    90 MICROgram(s) HFA Inhaler 2 Puff(s) Inhalation every 6 hours  ascorbic acid 500 milliGRAM(s) Oral two times a day  benzonatate 100 milliGRAM(s) Oral three times a day  cholecalciferol 2000 Unit(s) Oral daily  dexAMETHasone  Injectable 6 milliGRAM(s) IV Push daily  dextrose 40% Gel 15 Gram(s) Oral once  dextrose 5%. 1000 milliLiter(s) (50 mL/Hr) IV Continuous <Continuous>  dextrose 5%. 1000 milliLiter(s) (100 mL/Hr) IV Continuous <Continuous>  dextrose 50% Injectable 25 Gram(s) IV Push once  dextrose 50% Injectable 12.5 Gram(s) IV Push once  dextrose 50% Injectable 25 Gram(s) IV Push once  enoxaparin Injectable 40 milliGRAM(s) SubCutaneous every 12 hours  famotidine    Tablet 20 milliGRAM(s) Oral two times a day  glucagon  Injectable 1 milliGRAM(s) IntraMuscular once  insulin lispro (ADMELOG) corrective regimen sliding scale   SubCutaneous Before meals and at bedtime  loratadine 10 milliGRAM(s) Oral daily  multivitamin 1 Tablet(s) Oral daily  remdesivir  IVPB 100 milliGRAM(s) IV Intermittent every 24 hours  sodium chloride 0.9% lock flush 3 milliLiter(s) IV Push every 8 hours  zinc sulfate 220 milliGRAM(s) Oral daily    MEDICATIONS  (PRN):  acetaminophen   Tablet .. 650 milliGRAM(s) Oral every 6 hours PRN Temp greater or equal to 38C (100.4F), Mild Pain (1 - 3)  ondansetron Injectable 4 milliGRAM(s) IV Push every 6 hours PRN Nausea      ASSESSMENT / PLAN:  ----------------------------------------  34 yo F obese with COVID 19 infection with  hypoxia likely due to viral  PNA, she is with worsening hypoxia upgraded to NRB 3/8 night , seen by ID s/p Tocilizumab on 3/9 ,  and also on remdesevir and decadron iv , inflammatory  markers improving     Acute hypoxic respiratory failure / COVID-19 pneumonia - On remdesivir and dexamethasone. Also had administration of tocilizumab. No further hypoxia and does not require supplemental oxygen.    Transaminitis - Remdesivir to be discontinued.

## 2021-03-14 NOTE — PROGRESS NOTE ADULT - REASON FOR ADMISSION
Hypoxia  R/O COVID

## 2021-03-14 NOTE — DISCHARGE NOTE PROVIDER - NSDCCPCAREPLAN_GEN_ALL_CORE_FT
PRINCIPAL DISCHARGE DIAGNOSIS  Diagnosis: COVID-19  Assessment and Plan of Treatment: Continue with quarantine protocols. Follow up with your primary care physician for further management.      SECONDARY DISCHARGE DIAGNOSES  Diagnosis: Transaminitis  Assessment and Plan of Treatment: Follow up with your primary care physician for further management and repeat blood work to monitor liver functions.

## 2021-03-14 NOTE — DISCHARGE NOTE PROVIDER - CARE PROVIDER_API CALL
Primary Care Physician,   Phone: (   )    -  Fax: (   )    -  Follow Up Time:    [facility],   Phone: (   )    -  Fax: (   )    -  Follow Up Time:     [facility],   [address] [place] [address]  Phone: (   )    -  Fax: (   )    -  Follow Up Time:

## 2021-03-14 NOTE — DISCHARGE NOTE PROVIDER - NSDCMRMEDTOKEN_GEN_ALL_CORE_FT
Zithromax 250 mg oral capsule: 1 tab(s) orally once a day   Aspirin Enteric Coated 81 mg oral delayed release tablet: 1 tab(s) orally once a day

## 2021-03-15 ENCOUNTER — TRANSCRIPTION ENCOUNTER (OUTPATIENT)
Age: 36
End: 2021-03-15
